# Patient Record
Sex: FEMALE | Race: WHITE | Employment: FULL TIME | URBAN - METROPOLITAN AREA
[De-identification: names, ages, dates, MRNs, and addresses within clinical notes are randomized per-mention and may not be internally consistent; named-entity substitution may affect disease eponyms.]

---

## 2017-05-15 ENCOUNTER — TRANSFERRED RECORDS (OUTPATIENT)
Dept: HEALTH INFORMATION MANAGEMENT | Facility: CLINIC | Age: 52
End: 2017-05-15

## 2017-05-22 ENCOUNTER — MEDICAL CORRESPONDENCE (OUTPATIENT)
Dept: HEALTH INFORMATION MANAGEMENT | Facility: CLINIC | Age: 52
End: 2017-05-22

## 2017-05-23 ENCOUNTER — TELEPHONE (OUTPATIENT)
Dept: SURGERY | Facility: CLINIC | Age: 52
End: 2017-05-23

## 2017-05-23 NOTE — TELEPHONE ENCOUNTER
I called Nan to discuss scheduling an appointment for her to see Dr. Mccurdy in clinic. We received a referral for this patient from Dr. Eddie Lindsay for a rectal polyp. We discussed dates and found one on Wednesday 06/07/2017 at 11:00 am. I told her that I would have to verify it with Dr. Mccurdy. She said she would pencil it in on her calendar. I told her that I would let her know when I hear from Dr. Mccurdy about the appointment I offered her.

## 2017-05-24 ENCOUNTER — PRE VISIT (OUTPATIENT)
Dept: SURGERY | Facility: CLINIC | Age: 52
End: 2017-05-24

## 2017-05-24 NOTE — TELEPHONE ENCOUNTER
1.  Date/reason for appt: 6/7/17- Rectal Polyp    2.  Referring provider: Dr. Lindsay     3.  Call to patient (Yes / No - short description): No - pt is referred.     4.  Previous care at / records requested from:     1. [facility] (formerly [facility]) (Referral and records was received, already scanned into Epic 5/15/17, 5/22/17.)

## 2017-05-25 ENCOUNTER — TELEPHONE (OUTPATIENT)
Dept: SURGERY | Facility: CLINIC | Age: 52
End: 2017-05-25

## 2017-05-25 NOTE — TELEPHONE ENCOUNTER
I called Nan to let her know that I have rescheduled her appointment. I left a detailed message with the appointment details and my number.

## 2017-06-07 ENCOUNTER — OFFICE VISIT (OUTPATIENT)
Dept: SURGERY | Facility: CLINIC | Age: 52
End: 2017-06-07

## 2017-06-07 VITALS
HEART RATE: 86 BPM | TEMPERATURE: 98.3 F | HEIGHT: 61 IN | OXYGEN SATURATION: 97 % | BODY MASS INDEX: 30.96 KG/M2 | SYSTOLIC BLOOD PRESSURE: 134 MMHG | WEIGHT: 164 LBS | DIASTOLIC BLOOD PRESSURE: 79 MMHG

## 2017-06-07 DIAGNOSIS — K62.1 RECTAL POLYP: Primary | ICD-10-CM

## 2017-06-07 ASSESSMENT — PAIN SCALES - GENERAL: PAINLEVEL: NO PAIN (0)

## 2017-06-07 NOTE — PROGRESS NOTES
Colon and Rectal Surgery Consult Clinic Note    Chief Complaint:  Colonoscopy follow up    History of Present Illness:  Nan Keller is a 51 year old female with history of medically controlled asthma presenting to clinic for second opinion of a large polyp found on screening colonoscopy. Nan has been previously healthy. On May 16-17 she underwent a screening colonoscopy with Dr. Lindsay after a positive cologuard test.   At that time, he removed a small polyp (hyperplastic) and biopsied a second larger polyp (adenoma), located at 5 cm from the anal verge.  The polyp was tattooed.   CEA level were within normal limits and biopsy returned low risk for neoplasm.    Review of systems was entirely negative, among which she denies any gastrointestinal symptoms including--no abdominal pain, constipation/diarrhoea, nausea/vomiting, changes in bowel habits, rectal bleeding/gross bloody stools; no night sweats, increased sweating, weight loss.    Medical history:  No past medical history on file.  Surgical history:  Past Surgical History:   Procedure Laterality Date     BUNIONECTOMY CHEVRON  2/3/2012    Procedure:BUNIONECTOMY CHEVRON; Chevron Osteotomy Bunionectomy Right Foot; Surgeon:BOSTON BUSTAMANTE; Location:[facility] OR     LAMINECTOMY LUMBAR POSTERIOR MICROSCOPIC ONE LEVEL N/A 2016    Procedure: LAMINECTOMY LUMBAR POSTERIOR MICROSCOPIC ONE LEVEL;  Surgeon: Peng Polanco MD;  Location: [facility] OR   ,     Family history:  No family history on file.   Paternal grandfather: cardiac arrest (70s)  Paternal grandmother: Tuberculosis, seizure  Maternal grandmother: Heart failure (80s)  Maternal grandfather: stroke, HTN, DM2  Father: CHF, HTN, parkinson's disease, alzheimers (87)  Mother: Dm2, hypothyroidism  Siblings: (brother) COPD, DM2, renal stones; (sister) renal stones    Medications:  Current Outpatient Prescriptions   Medication Sig Dispense Refill     albuterol (PROAIR HFA, PROVENTIL HFA, VENTOLIN HFA)  "108 (90 BASE) MCG/ACT inhaler Inhale 2 puffs into the lungs every 6 hours       GABAPENTIN PO      B7 supplements  Ibuprofen 400mg PO qD    Allergies:  The patientis allergic to sulfa drugs; animal dander; and codeine sulfate.  Social history:  Social History   Substance Use Topics     Smoking status: Never Smoker     Smokeless tobacco: Never Used     Alcohol use Not on file   2-3 Alcoholic drinks/week, no illicit substance or drug use  Sexually active with male partner, no anal sex.    Marital status: .  Occupation: Flight paramedic past 8 years, previously EMS for 19 years.  Review of Systems:  Answers for HPI/ROS submitted by the patient on 5/30/2017   General Symptoms: No  Skin Symptoms: No  HENT Symptoms: No  EYE SYMPTOMS: No  HEART SYMPTOMS: No  LUNG SYMPTOMS: No  INTESTINAL SYMPTOMS: No  URINARY SYMPTOMS: No  GYNECOLOGIC SYMPTOMS: No  BREAST SYMPTOMS: No  SKELETAL SYMPTOMS: No  BLOOD SYMPTOMS: No  NERVOUS SYSTEM SYMPTOMS: No  MENTAL HEALTH SYMPTOMS: No    Physical Examination:  /79  Pulse 86  Temp 98.3  F (36.8  C) (Oral)  Ht 5' 1\"  Wt 164 lb  LMP 08/09/2014  SpO2 97%  BMI 30.99 kg/m2  General: Well appearing female in no apparent distress, pleasant, appropriately dressed for appointment/season.  Abdomen: Examination was deferred at this time due to patient's developed apprehension.    Labs 5/15/2017 outside clinic:  Na 136, K 3.6, CO2 104, Cl 22, BUN 12, Cr 0.8, Glucose 103.  Albumin 4.3.  LFTs normal.  Hgb 15, WBC 6.3, Plt 261    Assessment/Plan:  Rectal polyp, found to be an adenoma on biopsy. Patient referred by Dr. Lindsay for consideration of a transanal excision.  As the patient was very reluctant for examination without sedation, she will be scheduled for a flexible sigmoidoscopy in the endoscopy center.    Time spent: 30 min  >50% spent in discussing, counseling and coordinating care    Seymour COOPER served as scribe for the history, PMH, FH, SH.    I saw and discussed the patient's " history and evaluation. I answered all of her questions to the best of my ability.  Carmelita Mccurdy MD, MPH, FACS  Colon and Rectal Surgery Staff  [facility]      Referring Provider:  Carmelita Mccurdy MD   PHYSICIANS  [address]  [place] [address]     Primary Care Provider:  Navneet Gil

## 2017-06-07 NOTE — LETTER
2017       RE: Nan Keller  [address] [place], [place] [address] [facility] [address]     Dear Colleague,    Thank you for referring your patient, Nan Keller, to the [facility] COLON AND RECTAL SURGERY at [facility]. Please see a copy of my visit note below.    Colon and Rectal Surgery Consult Clinic Note    Chief Complaint:  Colonoscopy follow up    History of Present Illness:  Nan Keller is a 51 year old female with history of medically controlled asthma presenting to clinic for second opinion of a large polyp found on screening colonoscopy. Nan has been previously healthy. On May 16-17 she underwent a screening colonoscopy with Dr. Lindsay after a positive cologuard test.   At that time, he removed a small polyp (hyperplastic) and biopsied a second larger polyp (adenoma), located at 5 cm from the anal verge.  The polyp was tattooed.   CEA level were within normal limits and biopsy returned low risk for neoplasm.    Review of systems was entirely negative, among which she denies any gastrointestinal symptoms including--no abdominal pain, constipation/diarrhoea, nausea/vomiting, changes in bowel habits, rectal bleeding/gross bloody stools; no night sweats, increased sweating, weight loss.    Medical history:  No past medical history on file.  Surgical history:  Past Surgical History:   Procedure Laterality Date     BUNIONECTOMY CHEVRON  2/3/2012    Procedure:BUNIONECTOMY CHEVRON; Chevron Osteotomy Bunionectomy Right Foot; Surgeon:BOSTON BUSTAMANTE; Location:[facility] [place]     LAMINECTOMY LUMBAR POSTERIOR MICROSCOPIC ONE LEVEL N/A 2016    Procedure: LAMINECTOMY LUMBAR POSTERIOR MICROSCOPIC ONE LEVEL;  Surgeon: Peng Polanco MD;  Location: [facility] OR   1987    Family history:  No family history on file.   Paternal grandfather: cardiac arrest (70s)  Paternal grandmother: Tuberculosis, seizure  Maternal grandmother: Heart failure (80s)  Maternal grandfather: stroke, HTN,  "DM2  Father: CHF, HTN, parkinson's disease, alzheimers (87)  Mother: Dm2, hypothyroidism  Siblings: (brother) COPD, DM2, renal stones; (sister) renal stones    Medications:  Current Outpatient Prescriptions   Medication Sig Dispense Refill     albuterol (PROAIR HFA, PROVENTIL HFA, VENTOLIN HFA) 108 (90 BASE) MCG/ACT inhaler Inhale 2 puffs into the lungs every 6 hours       GABAPENTIN PO      B7 supplements  Ibuprofen 400mg PO qD    Allergies:  The patientis allergic to sulfa drugs; animal dander; and codeine sulfate.  Social history:  Social History   Substance Use Topics     Smoking status: Never Smoker     Smokeless tobacco: Never Used     Alcohol use Not on file   2-3 Alcoholic drinks/week, no illicit substance or drug use  Sexually active with male partner, no anal sex.    Marital status: .  Occupation: Flight paramedic past 8 years, previously EMS for 19 years.  Review of Systems:    Physical Examination:  /79  Pulse 86  Temp 98.3  F (36.8  C) (Oral)  Ht 5' 1\"  Wt 164 lb  LMP 08/09/2014  SpO2 97%  BMI 30.99 kg/m2  General: Well appearing female in no apparent distress, pleasant, appropriately dressed for appointment/season.  Abdomen: Examination was deferred at this time due to patient's developed apprehension.    Labs 5/15/2017 outside clinic:  Na 136, K 3.6, CO2 104, Cl 22, BUN 12, Cr 0.8, Glucose 103.  Albumin 4.3.  LFTs normal.  Hgb 15, WBC 6.3, Plt 261    Assessment/Plan:  Rectal polyp, found to be an adenoma on biopsy. Patient referred by Dr. Lindsay for consideration of a transanal excision.  As the patient was very reluctant for examination without sedation, she will be scheduled for a flexible sigmoidoscopy in the endoscopy center.    Time spent: 30 min  >50% spent in discussing, counseling and coordinating care    Seymour COOPER served as scribe for the history, PMH, FH, SH.    I saw and discussed the patient's history and evaluation. I answered all of her questions to the best of my " ability.  Carmelita Mccurdy MD, MPH, FACS  Colon and Rectal Surgery Staff  [facility]      Referring Provider:  Carmelita Mccurdy MD   PHYSICIANS  [address]  [place] [address]     Primary Care Provider:  Navneet Gil MD

## 2017-06-07 NOTE — MR AVS SNAPSHOT
After Visit Summary   6/7/2017    Nan Keller    MRN: [id number]           Patient Information     Date Of Birth          1965        Visit Information        Provider Department      6/7/2017 4:00 PM Carmelita Mccurdy MD [facility] Colon and Rectal Surgery        Today's Diagnoses     Rectal polyp    -  1       Follow-ups after your visit        Additional Services     GASTROENTEROLOGY ADULT REF PROCEDURE ONLY                 Your next 10 appointments already scheduled     Jun 19, 2017   Procedure with Carmelita Mccurdy MD   [facility], [place], Endoscopy ([facility], [facility])    [address]  [facility] [place] [address] [phone]           [facility] is located on the corner of [facility] [address] [facility] on the [facility]. It is easily accessible from virtually any point in the [facility] area, via [employer] and [employer].              Who to contact     Please call your clinic at [phone] to:    Ask questions about your health    Make or cancel appointments    Discuss your medicines    Learn about your test results    Speak to your doctor   If you have compliments or concerns about an experience at your clinic, or if you wish to file a complaint, please contact [facility] Patient Relations at [phone] or email us at [email or web address]         Additional Information About Your Visit        [employer] Information     [employer] gives you secure access to your electronic health record. If you see a primary care provider, you can also send messages to your care team and make appointments. If you have questions, please call your primary care clinic.  If you do not have a primary care provider, please call [phone] and they will assist you.      [employer] is an electronic gateway that provides easy, online access to your medical records. With [employer], you can request a clinic  "appointment, read your test results, renew a prescription or communicate with your care team.     To access your existing account, please contact your [facility] or call [phone] for assistance.        Care EveryWhere ID     This is your Care EveryWhere ID. This could be used by other organizations to access your [facility] medical records  [id number]        Your Vitals Were     Pulse Temperature Height Last Period Pulse Oximetry BMI (Body Mass Index)    86 98.3  F (36.8  C) (Oral) 5' 1\" 08/09/2014 97% 30.99 kg/m2       Blood Pressure from Last 3 Encounters:   06/07/17 134/79   08/09/16 108/51   04/10/12 101/44    Weight from Last 3 Encounters:   06/07/17 164 lb   08/09/16 149 lb   04/10/12 130 lb              We Performed the Following     GASTROENTEROLOGY ADULT REF PROCEDURE ONLY        Primary Care Provider Office Phone # Fax #    Navneet Gil [phone]       [address] [address]        Thank you!     Thank you for choosing [facility] COLON AND RECTAL SURGERY  for your care. Our goal is always to provide you with excellent care. Hearing back from our patients is one way we can continue to improve our services. Please take a few minutes to complete the written survey that you may receive in the mail after your visit with us. Thank you!             Your Updated Medication List - Protect others around you: Learn how to safely use, store and throw away your medicines at www.disposemymeds.org.          This list is accurate as of: 6/7/17 10:26 PM.  Always use your most recent med list.                   Brand Name Dispense Instructions for use    albuterol 108 (90 BASE) MCG/ACT Inhaler    PROAIR HFA/PROVENTIL HFA/VENTOLIN HFA     Inhale 2 puffs into the lungs every 6 hours       GABAPENTIN PO            "

## 2017-06-07 NOTE — NURSING NOTE
"Chief Complaint   Patient presents with     Clinic Care Coordination - Initial     new       Vitals:    06/07/17 1440   BP: 134/79   Pulse: 86   Temp: 98.3  F (36.8  C)   TempSrc: Oral   SpO2: 97%   Weight: 164 lb   Height: 5' 1\"       Body mass index is 30.99 kg/(m^2).  Carmelita JOHNSON LPN                     "

## 2017-06-09 ENCOUNTER — TELEPHONE (OUTPATIENT)
Dept: GASTROENTEROLOGY | Facility: CLINIC | Age: 52
End: 2017-06-09

## 2017-06-09 NOTE — TELEPHONE ENCOUNTER
Patient scheduled for Flexible sigmoidosocpy    Indication for procedure. Rectal polyp    Referring Provider. Dr. Lindsay    ? no    Arrival time verified? 10:00 am    Facility location verified? Yes, [address]    Instructions given regarding prep and procedure    Prep Type 2 fleet enemas    Are you taking any anticoagulants or blood thinners? no    Instructions given? yes    Electronic implanted devices? No    Pre procedure teaching completed? Yes    Transportation from procedure? yes    H&P / Pre op physical completed? NA

## 2017-06-19 ENCOUNTER — SURGERY (OUTPATIENT)
Age: 52
End: 2017-06-19

## 2017-06-19 ENCOUNTER — HOSPITAL ENCOUNTER (OUTPATIENT)
Facility: CLINIC | Age: 52
Discharge: HOME | End: 2017-06-19
Attending: COLON & RECTAL SURGERY | Admitting: COLON & RECTAL SURGERY
Payer: COMMERCIAL

## 2017-06-19 VITALS
DIASTOLIC BLOOD PRESSURE: 61 MMHG | OXYGEN SATURATION: 95 % | HEART RATE: 77 BPM | RESPIRATION RATE: 21 BRPM | SYSTOLIC BLOOD PRESSURE: 99 MMHG

## 2017-06-19 DIAGNOSIS — K62.1 RECTAL POLYP: Primary | ICD-10-CM

## 2017-06-19 LAB — FLEXIBLE SIGMOIDOSCOPY: NORMAL

## 2017-06-19 PROCEDURE — 40000104 ZZH STATISTIC MODERATE SEDATION < 10 MIN: Performed by: COLON & RECTAL SURGERY

## 2017-06-19 PROCEDURE — 25000132 ZZH RX MED GY IP 250 OP 250 PS 637: Performed by: COLON & RECTAL SURGERY

## 2017-06-19 PROCEDURE — 25000128 H RX IP 250 OP 636: Performed by: COLON & RECTAL SURGERY

## 2017-06-19 PROCEDURE — 25000125 ZZHC RX 250: Performed by: COLON & RECTAL SURGERY

## 2017-06-19 PROCEDURE — 45330 DIAGNOSTIC SIGMOIDOSCOPY: CPT | Performed by: COLON & RECTAL SURGERY

## 2017-06-19 RX ORDER — FENTANYL CITRATE 50 UG/ML
INJECTION, SOLUTION INTRAMUSCULAR; INTRAVENOUS PRN
Status: DISCONTINUED | OUTPATIENT
Start: 2017-06-19 | End: 2017-06-19 | Stop reason: HOSPADM

## 2017-06-19 RX ORDER — SIMETHICONE
LIQUID (ML) MISCELLANEOUS PRN
Status: DISCONTINUED | OUTPATIENT
Start: 2017-06-19 | End: 2017-06-19 | Stop reason: HOSPADM

## 2017-06-19 RX ADMIN — Medication 2 ML: at 09:57

## 2017-06-19 RX ADMIN — FENTANYL CITRATE 100 MCG: 50 INJECTION, SOLUTION INTRAMUSCULAR; INTRAVENOUS at 10:28

## 2017-06-19 RX ADMIN — MIDAZOLAM HYDROCHLORIDE 2 MG: 1 INJECTION, SOLUTION INTRAMUSCULAR; INTRAVENOUS at 10:28

## 2017-06-19 NOTE — IP AVS SNAPSHOT
MRN:[id number]                      After Visit Summary   6/19/2017    Nan Keller    MRN: [id number]           Thank you!     Thank you for choosing [facility] for your care. Our goal is always to provide you with excellent care. Hearing back from our patients is one way we can continue to improve our services. Please take a few minutes to complete the written survey that you may receive in the mail after you visit with us. Thank you!        Patient Information     Date Of Birth          1965        About your hospital stay     You were admitted on:  June 19, 2017 You last received care in the:  [facility], Endoscopy    You were discharged on:  June 19, 2017       Who to Call     For medical emergencies, please call 911.  For non-urgent questions about your medical care, please call your primary care provider or clinic, [phone]  For questions related to your surgery, please call your surgery clinic        Attending Provider     Provider Specialty    Carmelita Mccurdy MD Colon and Rectal Surgery       Primary Care Provider Office Phone # Fax #    Navneet Gil [phone]      Your next 10 appointments already scheduled     Jun 19, 2017   Procedure with Carmelita Mccurdy MD   [facility] ([facility])    [address] [facility] [address] [phone]           The [facility] is located on the corner of [facility] [address] and [facility] on the [facility]. It is easily accessible from virtually any point in the [facility] area, via [address].              Further instructions from your care team       Discharge Instructions after Colonoscopy  or Sigmoidoscopy    Today you had a Sigmoidoscopy    Activity and Diet  You were given medicine for pain. You may be dizzy or sleepy.  For 24 hours:    Do not drive or use heavy equipment.    Do not make important  decisions.    Do not drink any alcohol.  You may return to your normal diet and medicines.    Discomfort    Air was placed in your colon during the exam in order to see it. Walking helps to pass the air.    You may take Tylenol (acetaminophen) for pain unless your doctor has told you not to.  Do not take aspirin or ibuprofen (Advil, Motrin, or other anti-inflammatory  drugs) for _____ days.    When to call:    Call right away if you have:    Unusual pain in belly or chest pain not relieved with passing air.    More than 1 to 2 Tablespoons of bleeding from your rectum.    Fever above 100.6  F (37.5  C).    If you have severe pain, bleeding, or shortness of breath, go to an emergency room.    If you have questions, call:  Monday to Friday, 7 a.m. to 4:30 p.m.  Endoscopy: [phone] (We may have to call you back)    After hours  Hospital: [phone] (Ask for the GI fellow on call)    Pending Results     No orders found from 6/17/2017 to 6/20/2017.            Admission Information     Date & Time Provider Department Dept. Phone    6/19/2017 Carmelita Mccurdy MD [facility], Endoscopy [phone]      Your Vitals Were     Blood Pressure Pulse Respirations Last Period Pulse Oximetry       102/77 77 12 08/09/2014 99%       MyChart Information     [employer] gives you secure access to your electronic health record. If you see a primary care provider, you can also send messages to your care team and make appointments. If you have questions, please call your primary care clinic.  If you do not have a primary care provider, please call [phone] and they will assist you.        Care EveryWhere ID     This is your Care EveryWhere ID. This could be used by other organizations to access your [facility] medical records  [id number]           Review of your medicines      UNREVIEWED medicines. Ask your doctor about these medicines        Dose / Directions    albuterol 108 (90 BASE) MCG/ACT Inhaler   Commonly known as:   PROAIR HFA/PROVENTIL HFA/VENTOLIN HFA        Dose:  2 puff   Inhale 2 puffs into the lungs every 6 hours   Refills:  0       GABAPENTIN PO        Refills:  0                Protect others around you: Learn how to safely use, store and throw away your medicines at www.disposemymeds.org.             Medication List: This is a list of all your medications and when to take them. Check marks below indicate your daily home schedule. Keep this list as a reference.      Medications           Morning Afternoon Evening Bedtime As Needed    albuterol 108 (90 BASE) MCG/ACT Inhaler   Commonly known as:  PROAIR HFA/PROVENTIL HFA/VENTOLIN HFA   Inhale 2 puffs into the lungs every 6 hours                                GABAPENTIN PO

## 2017-06-19 NOTE — IP AVS SNAPSHOT
[facility], [place], Endoscopy    [address] [facility] [address]    Phone:  [phone]                                       After Visit Summary   6/19/2017    Nan Keller    MRN: [id number]           After Visit Summary Signature Page     I have received my discharge instructions, and my questions have been answered. I have discussed any challenges I see with this plan with the nurse or doctor.    ..........................................................................................................................................  Patient/Patient Representative Signature      ..........................................................................................................................................  Patient Representative Print Name and Relationship to Patient    ..................................................               ................................................  Date                                            Time    ..........................................................................................................................................  Reviewed by Signature/Title    ...................................................              ..............................................  Date                                                            Time

## 2017-06-19 NOTE — OR NURSING
Procedure: Flex sig without interventions  Sedation: Conscious sedation, versed 2 mg, fentanyl 100 mcg  O2: 2 LPM for procedure, room air post.  Tolerated: Well. VS stable during and post procedure.  Report: Given to recovery RN  Pt to recovery area, condition stable, accompanied by RN.     Linda Ramos RN

## 2017-06-19 NOTE — DISCHARGE INSTRUCTIONS
Discharge Instructions after Colonoscopy  or Sigmoidoscopy    Today you had a Sigmoidoscopy    Activity and Diet  You were given medicine for pain. You may be dizzy or sleepy.  For 24 hours:    Do not drive or use heavy equipment.    Do not make important decisions.    Do not drink any alcohol.  You may return to your normal diet and medicines.    Discomfort    Air was placed in your colon during the exam in order to see it. Walking helps to pass the air.    You may take Tylenol (acetaminophen) for pain unless your doctor has told you not to.  Do not take aspirin or ibuprofen (Advil, Motrin, or other anti-inflammatory  drugs) for _____ days.    When to call:    Call right away if you have:    Unusual pain in belly or chest pain not relieved with passing air.    More than 1 to 2 Tablespoons of bleeding from your rectum.    Fever above 100.6  F (37.5  C).    If you have severe pain, bleeding, or shortness of breath, go to an emergency room.    If you have questions, call:  Monday to Friday, 7 a.m. to 4:30 p.m.  Endoscopy: [phone] (We may have to call you back)    After hours  Hospital: [phone] (Ask for the GI fellow on call)

## 2017-06-21 ENCOUNTER — CARE COORDINATION (OUTPATIENT)
Dept: SURGERY | Facility: CLINIC | Age: 52
End: 2017-06-21

## 2017-06-21 NOTE — PROGRESS NOTES
Rec'd following msg from our :    Wilfrido Flores,     This patient has a flex sig on 6/19/17 with Dr. Mccurdy.  She called today and she stated she had a painful lump just below her navel.  Also stated she is experiencing acid reflux since the flex sig.  Could someone call the patient?     Thank you,   Mandy     Called pt to discuss symptoms but had to lv VM msg.  Asked pt to call clinic if sx continued and supplied our #.

## 2017-06-21 NOTE — PROGRESS NOTES
"Nan is calling Dr. Mccurdy.  She reports flex sig 2 days ago.  Yesterday she noted \"a hard mass\" above her belly button.  It's tender if she pushes on it.  Doesn't recall it being here before.  Hasn't changed and is still there.  Yesterday had acid reflux.  Usually does not.  That has improved and is better today.  Is having BM's.  Is not nauseated, does not have a fever.  Tomorrow will be at work [phone].    Advised she call back with changes.  After hours number provided so can reach on call physician if problems arise in the night.  Nan verbalizes understanding and agreement with the plan.  "

## 2017-06-24 ENCOUNTER — HEALTH MAINTENANCE LETTER (OUTPATIENT)
Age: 52
End: 2017-06-24

## 2017-06-27 ENCOUNTER — TELEPHONE (OUTPATIENT)
Dept: SURGERY | Facility: CLINIC | Age: 52
End: 2017-06-27

## 2017-06-27 NOTE — TELEPHONE ENCOUNTER
Nan called to schedule her surgery. She left a message and I returned her call. We discussed dates and she opted to wait until September for the surgery due to her work schedule. She will have her pre-op done locally. She started asking a lot of questions about recovery and bowel prep. I asked her if she would like a call from Erika Bailey, RAFAELCC and she agreed that would be beneficial.

## 2017-06-29 ENCOUNTER — TELEPHONE (OUTPATIENT)
Dept: SURGERY | Facility: CLINIC | Age: 52
End: 2017-06-29

## 2017-06-29 NOTE — TELEPHONE ENCOUNTER
Nan returned my call and let me know that she would like to reschedule because she and her  don't want to compete for the bathroom while they are both prepping for their procedures. I told her that she does not have a prep and she said if that is the case then she can keep her surgery date. I told her that I would send her surgery packet out this week.

## 2017-06-29 NOTE — TELEPHONE ENCOUNTER
Nan called and left a message asking me to call her back. I attempted to call her home phone number and was unable to get through. I called her mobile phone and left a message letting her know that I received her message. I left my number for her to return my call.

## 2017-08-30 ENCOUNTER — DOCUMENTATION ONLY (OUTPATIENT)
Dept: SURGERY | Facility: CLINIC | Age: 52
End: 2017-08-30

## 2017-08-30 NOTE — PROGRESS NOTES
[facility] called to discuss her procedure and find out if there are any labs we need and where to send the pre-op visit note. I gave them the name of the procedure and the fax number where they should send the note.

## 2017-09-14 ENCOUNTER — ANESTHESIA EVENT (OUTPATIENT)
Dept: SURGERY | Facility: AMBULATORY SURGERY CENTER | Age: 52
End: 2017-09-14

## 2017-09-15 ENCOUNTER — HOSPITAL ENCOUNTER (OUTPATIENT)
Facility: AMBULATORY SURGERY CENTER | Age: 52
End: 2017-09-15
Attending: COLON & RECTAL SURGERY

## 2017-09-15 ENCOUNTER — ANESTHESIA (OUTPATIENT)
Dept: SURGERY | Facility: AMBULATORY SURGERY CENTER | Age: 52
End: 2017-09-15

## 2017-09-15 ENCOUNTER — SURGERY (OUTPATIENT)
Age: 52
End: 2017-09-15

## 2017-09-15 VITALS
SYSTOLIC BLOOD PRESSURE: 106 MMHG | DIASTOLIC BLOOD PRESSURE: 57 MMHG | OXYGEN SATURATION: 99 % | RESPIRATION RATE: 16 BRPM | HEART RATE: 57 BPM | WEIGHT: 164 LBS | TEMPERATURE: 98.6 F | HEIGHT: 60 IN | BODY MASS INDEX: 32.2 KG/M2

## 2017-09-15 DIAGNOSIS — K62.89 RECTAL MASS: Primary | ICD-10-CM

## 2017-09-15 RX ORDER — MEPERIDINE HYDROCHLORIDE 25 MG/ML
12.5 INJECTION INTRAMUSCULAR; INTRAVENOUS; SUBCUTANEOUS
Status: DISCONTINUED | OUTPATIENT
Start: 2017-09-15 | End: 2017-09-16 | Stop reason: HOSPADM

## 2017-09-15 RX ORDER — LIDOCAINE HYDROCHLORIDE 20 MG/ML
INJECTION, SOLUTION INFILTRATION; PERINEURAL PRN
Status: DISCONTINUED | OUTPATIENT
Start: 2017-09-15 | End: 2017-09-15

## 2017-09-15 RX ORDER — LIDOCAINE 40 MG/G
CREAM TOPICAL
Status: DISCONTINUED | OUTPATIENT
Start: 2017-09-15 | End: 2017-09-15 | Stop reason: HOSPADM

## 2017-09-15 RX ORDER — OXYCODONE HYDROCHLORIDE 5 MG/1
5 TABLET ORAL EVERY 4 HOURS PRN
Status: DISCONTINUED | OUTPATIENT
Start: 2017-09-15 | End: 2017-09-16 | Stop reason: HOSPADM

## 2017-09-15 RX ORDER — ONDANSETRON 4 MG/1
4 TABLET, ORALLY DISINTEGRATING ORAL
Status: DISCONTINUED | OUTPATIENT
Start: 2017-09-15 | End: 2017-09-16 | Stop reason: HOSPADM

## 2017-09-15 RX ORDER — DEXAMETHASONE SODIUM PHOSPHATE 4 MG/ML
INJECTION, SOLUTION INTRA-ARTICULAR; INTRALESIONAL; INTRAMUSCULAR; INTRAVENOUS; SOFT TISSUE PRN
Status: DISCONTINUED | OUTPATIENT
Start: 2017-09-15 | End: 2017-09-15

## 2017-09-15 RX ORDER — SODIUM CHLORIDE, SODIUM LACTATE, POTASSIUM CHLORIDE, CALCIUM CHLORIDE 600; 310; 30; 20 MG/100ML; MG/100ML; MG/100ML; MG/100ML
INJECTION, SOLUTION INTRAVENOUS CONTINUOUS
Status: DISCONTINUED | OUTPATIENT
Start: 2017-09-15 | End: 2017-09-16 | Stop reason: HOSPADM

## 2017-09-15 RX ORDER — FENTANYL CITRATE 50 UG/ML
25-50 INJECTION, SOLUTION INTRAMUSCULAR; INTRAVENOUS
Status: DISCONTINUED | OUTPATIENT
Start: 2017-09-15 | End: 2017-09-16 | Stop reason: HOSPADM

## 2017-09-15 RX ORDER — ACETAMINOPHEN 325 MG/1
975 TABLET ORAL ONCE
Status: COMPLETED | OUTPATIENT
Start: 2017-09-15 | End: 2017-09-15

## 2017-09-15 RX ORDER — SODIUM CHLORIDE, SODIUM LACTATE, POTASSIUM CHLORIDE, CALCIUM CHLORIDE 600; 310; 30; 20 MG/100ML; MG/100ML; MG/100ML; MG/100ML
INJECTION, SOLUTION INTRAVENOUS CONTINUOUS
Status: DISCONTINUED | OUTPATIENT
Start: 2017-09-15 | End: 2017-09-15 | Stop reason: HOSPADM

## 2017-09-15 RX ORDER — ONDANSETRON 4 MG/1
4 TABLET, ORALLY DISINTEGRATING ORAL EVERY 30 MIN PRN
Status: DISCONTINUED | OUTPATIENT
Start: 2017-09-15 | End: 2017-09-16 | Stop reason: HOSPADM

## 2017-09-15 RX ORDER — ONDANSETRON 2 MG/ML
INJECTION INTRAMUSCULAR; INTRAVENOUS PRN
Status: DISCONTINUED | OUTPATIENT
Start: 2017-09-15 | End: 2017-09-15

## 2017-09-15 RX ORDER — PROPOFOL 10 MG/ML
INJECTION, EMULSION INTRAVENOUS CONTINUOUS PRN
Status: DISCONTINUED | OUTPATIENT
Start: 2017-09-15 | End: 2017-09-15

## 2017-09-15 RX ORDER — ONDANSETRON 2 MG/ML
4 INJECTION INTRAMUSCULAR; INTRAVENOUS EVERY 30 MIN PRN
Status: DISCONTINUED | OUTPATIENT
Start: 2017-09-15 | End: 2017-09-16 | Stop reason: HOSPADM

## 2017-09-15 RX ORDER — NALOXONE HYDROCHLORIDE 0.4 MG/ML
.1-.4 INJECTION, SOLUTION INTRAMUSCULAR; INTRAVENOUS; SUBCUTANEOUS
Status: DISCONTINUED | OUTPATIENT
Start: 2017-09-15 | End: 2017-09-16 | Stop reason: HOSPADM

## 2017-09-15 RX ORDER — BUPIVACAINE HYDROCHLORIDE 5 MG/ML
INJECTION, SOLUTION PERINEURAL PRN
Status: DISCONTINUED | OUTPATIENT
Start: 2017-09-15 | End: 2017-09-15 | Stop reason: HOSPADM

## 2017-09-15 RX ORDER — GABAPENTIN 300 MG/1
300 CAPSULE ORAL ONCE
Status: COMPLETED | OUTPATIENT
Start: 2017-09-15 | End: 2017-09-15

## 2017-09-15 RX ORDER — PROPOFOL 10 MG/ML
INJECTION, EMULSION INTRAVENOUS PRN
Status: DISCONTINUED | OUTPATIENT
Start: 2017-09-15 | End: 2017-09-15

## 2017-09-15 RX ORDER — OXYCODONE HYDROCHLORIDE 5 MG/1
5 TABLET ORAL EVERY 6 HOURS PRN
Qty: 10 TABLET | Refills: 0 | Status: SHIPPED | OUTPATIENT
Start: 2017-09-15 | End: 2018-05-04

## 2017-09-15 RX ORDER — KETAMINE HYDROCHLORIDE 10 MG/ML
INJECTION, SOLUTION INTRAMUSCULAR; INTRAVENOUS PRN
Status: DISCONTINUED | OUTPATIENT
Start: 2017-09-15 | End: 2017-09-15

## 2017-09-15 RX ADMIN — PROPOFOL 50 MG: 10 INJECTION, EMULSION INTRAVENOUS at 10:58

## 2017-09-15 RX ADMIN — PROPOFOL 100 MCG/KG/MIN: 10 INJECTION, EMULSION INTRAVENOUS at 11:00

## 2017-09-15 RX ADMIN — KETAMINE HYDROCHLORIDE 25 MG: 10 INJECTION, SOLUTION INTRAMUSCULAR; INTRAVENOUS at 11:11

## 2017-09-15 RX ADMIN — DEXAMETHASONE SODIUM PHOSPHATE 4 MG: 4 INJECTION, SOLUTION INTRA-ARTICULAR; INTRALESIONAL; INTRAMUSCULAR; INTRAVENOUS; SOFT TISSUE at 11:06

## 2017-09-15 RX ADMIN — ACETAMINOPHEN 975 MG: 325 TABLET ORAL at 10:20

## 2017-09-15 RX ADMIN — LIDOCAINE HYDROCHLORIDE 70 MG: 20 INJECTION, SOLUTION INFILTRATION; PERINEURAL at 10:58

## 2017-09-15 RX ADMIN — FENTANYL CITRATE 25 MCG: 50 INJECTION, SOLUTION INTRAMUSCULAR; INTRAVENOUS at 12:34

## 2017-09-15 RX ADMIN — OXYCODONE HYDROCHLORIDE 5 MG: 5 TABLET ORAL at 12:34

## 2017-09-15 RX ADMIN — PROPOFOL 20 MG: 10 INJECTION, EMULSION INTRAVENOUS at 11:11

## 2017-09-15 RX ADMIN — ONDANSETRON 4 MG: 2 INJECTION INTRAMUSCULAR; INTRAVENOUS at 11:06

## 2017-09-15 RX ADMIN — GABAPENTIN 300 MG: 300 CAPSULE ORAL at 10:20

## 2017-09-15 RX ADMIN — PROPOFOL 20 MG: 10 INJECTION, EMULSION INTRAVENOUS at 11:00

## 2017-09-15 RX ADMIN — BUPIVACAINE HYDROCHLORIDE 10 ML: 5 INJECTION, SOLUTION PERINEURAL at 12:01

## 2017-09-15 RX ADMIN — ONDANSETRON 4 MG: 2 INJECTION INTRAMUSCULAR; INTRAVENOUS at 12:12

## 2017-09-15 RX ADMIN — SODIUM CHLORIDE, SODIUM LACTATE, POTASSIUM CHLORIDE, CALCIUM CHLORIDE: 600; 310; 30; 20 INJECTION, SOLUTION INTRAVENOUS at 10:54

## 2017-09-15 NOTE — OP NOTE
PREOPERATIVE DIAGNOSIS:  Rectal polyp; biopsy shows adenoma.      POSTOPERATIVE DIAGNOSIS:  Rectal polyp; biopsy shows adenoma.      PROCEDURE:  Transanal excision of rectal polyp.      SURGEON:  Carmelita Mccurdy MD      FIRST ASSISTANT:  Craig Mott MD, [facility] Colorectal Surgery Fellow        SECOND ASSISTANT:  Kaiser Escobedo MD, [facility] General Surgery Resident      ANESTHESIA:  General sedation.      INDICATIONS:  Nan is a 52-year-old female who underwent a screening colonoscopy.  There was a broad-based polyp seen in the distal rectum.  She was referred for further management here.  She was examined in the Endoscopy Suite.  The polyp did not appear to be a cancer.  It appeared to be a good candidate for local excision.  She presents today for that procedure.      DESCRIPTION OF PROCEDURE:  She was brought to the operating room and placed in the prone position.  Buttocks were taped apart.  She was prepped with Betadine paint.  We performed a time-out confirming the name of the patient and planned procedure.  We irrigated the rectum with dilute Betadine.  The lesion was approximately 6 cm from the anal verge in the anterior midline.  This was retracted into the field using some stay sutures with 3-0 Vicryl.  I did a vaginal exam, and it did appear to be by palpation at least above the level of the vagina.  Using electrocautery, I excised this circumferentially with a several-millimeter margin.  I did not think the polyp appeared to harbor an adenocarcinoma, and I did not think a wider margin would be required.  The defect was approximately 2 cm in greatest dimension, and it was closed in a semi-transverse fashion using a lot of running 3-0 Vicryl stitch.  The vagina was inspected with the proctoscope.  There was no evidence of any bleeding or visible stitches.  The proctoscope was then inserted in the anus and easily advanced to the area of the suture line, which was not bleeding.   With a little gentle manipulation with the obturator, the proctoscope was easily advanced beyond and proximal to the closure, and there was no evidence of a rectal obstruction.      SPECIMENS SUBMITTED:  Rectal polyp.      ESTIMATED BLOOD LOSS:  1 mL         SARAH RESENDEZ MD             D: 09/15/2017 12:02   T: 09/15/2017 12:53   MT: whitney      Name:     ISHA CORTES   MRN:      [id number]        Account:        [id number]   :      1965           Procedure Date: 09/15/2017      Document: [id number]       cc: Eddie Lindsay DO

## 2017-09-15 NOTE — IP AVS SNAPSHOT
[facility]    [address] [place] [address]    Phone:  [phone]    Fax:  [phone]                                       After Visit Summary   9/15/2017    Nan Keller    MRN: [id number]           After Visit Summary Signature Page     I have received my discharge instructions, and my questions have been answered. I have discussed any challenges I see with this plan with the nurse or doctor.    ..........................................................................................................................................  Patient/Patient Representative Signature      ..........................................................................................................................................  Patient Representative Print Name and Relationship to Patient    ..................................................               ................................................  Date                                            Time    ..........................................................................................................................................  Reviewed by Signature/Title    ...................................................              ..............................................  Date                                                            Time

## 2017-09-15 NOTE — ANESTHESIA POSTPROCEDURE EVALUATION
Patient: Nan Keller    Procedure(s):  Transanal Excision of Rectal Polyp - Wound Class: II-Clean Contaminated    Diagnosis:Rectal Polyp  Diagnosis Additional Information: No value filed.    Anesthesia Type:  MAC    Note:  Anesthesia Post Evaluation    Patient location during evaluation: PACU  Patient participation: Able to fully participate in evaluation  Level of consciousness: awake and alert  Pain management: adequate  Airway patency: patent  Cardiovascular status: hemodynamically stable  Respiratory status: nonlabored ventilation, spontaneous ventilation and room air  Hydration status: euvolemic  PONV: none     Anesthetic complications: None          Last vitals:  Vitals:    09/15/17 1245 09/15/17 1300 09/15/17 1315   BP: 107/61 107/62 106/57   Pulse:      Resp: 16 16 16   Temp:  37  C (98.6  F)    SpO2: 99% 99% 99%         Electronically Signed By: Chandana Wilson MD  September 15, 2017  2:47 PM

## 2017-09-15 NOTE — DISCHARGE INSTRUCTIONS
Anorectal Surgery Instructions      What can I expect after anorectal surgery?  Most anorectal procedures are done as outpatient surgery, and you go home the same day as the procedure. A few surgical procedures will require that you stay in the hospital for about one to three days. No matter where the procedure is done or how long or short it takes, these recommendations will help you heal and feel more comfortable.    Medicines:  The anal area is very sensitive; you can expect to have some pain for up to 2-4 weeks after the procedure. Your doctor will give you a prescription for one or more pain medications. In general, there are two types of medicines that can provide relief.      Non-steroidal anti-inflammatory drugs (NSAIDS). This class of drugs includes ibuprophen and naproxen. Your doctor may give you a prescription for these, or you can buy the identical drugs in smaller size tablets, over the counter. Brand names include: Advil or Aleve. In general, it is best to take these drugs on a regular basis following your surgery. Some patients cannot take these drugs, either because they cause stomach upset or if the individual has a history of ulcers or gastritis. The drugs are best taken with food. The maximum dose of ibuprofen is 800 milligrams 3 times per dayOR 600 milligrams 4 times per day. The maximum does of naproxen is 500 milligrams 2 times per day. Your doctor may give you a prescription for one or the other of these drugs, or you can buy them at your drugstore.    Narcotic pain relievers. These include Vicodin, Percocet, and Tylenol number 3. These are all prescription medications. These should be used as prescribed and as needed. Because these drugs have side effects (including constipation), you should reduce your use of these medications as tolerated as your pain improves.    In general, the best strategy is to take (if you are able to tolerate it) an NSAID medication on a regular basis until your  pain has largely gone away. You can take the narcotic pain medicine in addition to the NSAID medicine. Most patients use the full dose of the NSAID medication and narcotic pain reliever for the first few days after their operation. As your pain begins to lessen, you should cut back on your narcotic use while continuing to take your regular NSAID medication.    Some patients find it easiest to transition away from narcotic drugs by also taking acetaminophen (Tylenol). However, it is important to realize that most narcotic pain relievers also have acetaminophen, and excessive doses of acetaminophen can be dangerous.Accordingly, you can substitute 1000 milligrams of acetaminophen (two Extra Strength Tylenol or similar generic) for any given dose of narcotic, but acetominophen should not be taken in addition to a full narcotic dose that contains acetaminophen. The maximum acceptable dose of acetaminophen is 4000 milligrams.    Refilling prescriptions. If you need additional pain medication, please call the triage nurse at [phone] during normal business hours (8 a.m. to 4 p.m., Monday though Friday) or have your pharmacy fax a refill request to [phone]. If you call after hours or on the weekends, the doctor on call may not know you personally and may not renew narcotic pain medication by phone. Call your primary care provider for all other medication refills.     Bowel function and Perineal care:  Bowel movements can be very painful. It is important to have regular bowel movements at least every other day and to keep your stool soft. Your doctor may tell you to take a stool softener, such as Colace, once or twice a day. Try to avoid constipation and/or diarrhea as this can make the pain and bleeding worse. A high fiber diet, including at least four servings of fruits or vegetables daily, will help to keep your bowel movements regular and soft. If you have trouble getting enough fiber in your daily diet, a fiber  supplement can be considered, including Metamucil, Benefiber, or Citrucel, and can be bought at your local grocery store or pharmacy. It is important to drink six to eight glasses of water or juice everyday when using fiber products.    You can expect to have some bleeding after bowel movements, but it should stop soon after you wipe. Use a wet cloth or perianal pad (Tucks or Preparation H pads) to gently wipe the area after each bowel movement. Do not rub the anal area or use a lot of pressure. Using a spray bottle filled with warm water helps loosen any remaining stool. Blot gently with a soft dry cloth or tissue paper.    If possible, take a tub bath immediately after each bowel movement. Baths should be take at least 3 times daily for the first week to 10 days following your procedure. You should soak in the tub for 10 to 15 minutes each time with water as warm as you can tolerate. Even after you go back to work, it is a good idea to sit in the tub in the morning, after returning from work, and again in the evening before bedtime.    Constipation will cause you to strain when you have a bowel movement. The hard stool will be difficult to pass, will increase pain and bleeding, and will slow down healing. If you have not had a bowel movement within 2 days, take 2 teaspoons of Milk of Magnesia in the morning. If there are no results, repeat this. Stop taking Milk of Magnesia or other laxatives if you begin to have diarrhea.    Diarrhea can occur if too much laxative is taken or for several other reasons. If you have 3 or more loose, watery stools in a 24-hour period, stop taking laxatives. Continue to eat a high fiber diet and to take bulk-forming agents such as Metamucil, Benefiber, or Citrucel. You may take Immodium as directed on the package but please call the triage nurse, [phone], if this was not discussed with you surgeon preoperatively.    If you are still having diarrhea or constipation after you have  tried these recommendations, please call the triage nurse line, [phone].    Bleeding/Infection:  Infection around the anal opening is not very common. The anal area has excellent blood supply, which helps the area to heal. Bloody discharge after bowel movements is normal and may last 2 to 4 weeks after your surgery. However, if you bleed between bowel movements and cannot get it to stop, call the triage nurse immediately [phone].    Activity:  After your procedure, there are no restrictions on your activity except restrictions surrounding being on narcotics and in pain, such as no heavy machine operating or driving. You may walk, climb stairs, ride in a car, and sit as tolerated. It is helpful to avoid sitting in one position for long periods (2 or more hours). After some surgeries, you may be told not to perform any lifting (more than 10 pounds) for several weeks after surgery.    When do I need to call the doctor or triage nurse?  If you experience any of the problems listed here, call our triage nurse during business hours ([phone]). The nurse will help you with your problem or have the doctor call you. After hours and on weekends, please call the main hospital number ([phone]) and ask for the colon and rectal surgery person on call. Some is available to help you 24 hours a day, seven days a week.    Fever greater than 101 degrees    Chills    Foul-smelling drainage    Nausea and vomiting    Diarrhea - greater than 3 water stools in 24 hours    Constipation - no bowel movement after 3 days    Severe bleeding that does not stop soon after a bowel movement    Problems with the incision, including increased pain, swelling, or redness        [facility]  [address] [place] [address]  Phone: [phone]                [facility]  Home Care Following Anesthesia  For 24 hours after surgery:  1. Get plenty of rest.  A responsible  adult must stay with you for at least 24 hours after you leave the surgery center.  2. Do not drive or use heavy equipment.  If you have weakness or tingling, don't drive or use heavy equipment until this feeling goes away.   3. Do not drink alcohol.   4. Avoid strenuous or risky activities.  Ask for help when climbing stairs.  5. You may feel lightheaded.  IF so, sit for a few minutes before standing.  Have someone help you get up.   6. If you have nausea (feel sick to your stomach): Drink only clear liquids such as apple juice, ginger ale, broth or 7-Up.  Rest may also help.  Be sure to drink enough fluids.  Move to a regular diet as you feel able.   7. You may have a slight fever.  Call the doctor if your fever is over 100 F (37.7 C) (taken under the tongue) or lasts longer than 24 hours.  8. You may have a dry mouth, a sore throat, muscle aches or trouble sleeping. These should go away after 24 hours.  9. Do not make important or legal decisions.     Tips for taking pain medications  To get the best pain relief possible, remember these points:    Take pain medications as directed, before pain becomes severe.    Pain medication can upset your stomach: taking it with food may help.    Constipation is a common side effect of pain medication. Drink plenty of  fluids.    Eat foods high in fiber. Take a stool softener if recommended by your doctor or pharmacist.    Do not drink alcohol, drive or operate machinery while taking pain medications.    Ask about other ways to control pain, such as with heat, ice or relaxation.    Tylenol/Acetaminophen Consumption  To help encourage the safe use of acetaminophen, the makers of TYLENOL  have lowered the maximum daily dose for single-ingredient Extra Strength TYLENOL  (acetaminophen) products sold in the [place] from 8 pills per day (4,000 mg) to 6 pills per day (3,000 mg). The dosing interval has also changed from 2 pills every 4-6 hours to 2 pills every 6 hours.    If you feel  your pain relief is insufficient, you may take Tylenol/Acetaminophen in addition to your narcotic pain medication.     Be careful not to exceed 3,000 mg of Tylenol/Acetaminophen in a 24 hour period from all sources.    If you are taking extra strength Tylenol/acetaminophen (500 mg), the maximum dose is 6 tablets in 24 hours.    If you are taking regular strength acetaminophen (325 mg), the maximum dose is 9 tablets in 24 hours.    Call a doctor for any of the followin. Signs of infection (fever, growing tenderness at the surgery site, a large amount of drainage or bleeding, severe pain, foul-smelling drainage, redness, swelling).  2. It has been over 8 to 10 hours since surgery and you are still not able to urinate (pass water).  3. Headache for over 24 hours.  Your doctor is:  Dr. Carmelita Mccurdy, Colon Rectal: [phone]                    Or dial [phone] and ask for the resident on call for:  Colon Rectal  For emergency care, call the:  [facility]:  [phone] (TTY for hearing impaired: [phone])

## 2017-09-15 NOTE — ANESTHESIA PREPROCEDURE EVALUATION
Anesthesia Evaluation     .             ROS/MED HX    ENT/Pulmonary:     (+)asthma , . .    Neurologic:       Cardiovascular:         METS/Exercise Tolerance:     Hematologic:         Musculoskeletal:         GI/Hepatic:         Renal/Genitourinary:         Endo:     (+) Obesity, .      Psychiatric:         Infectious Disease:         Malignancy:         Other:                     Physical Exam  Normal systems: dental    Airway   Mallampati: II  TM distance: >3 FB  Neck ROM: full    Dental     Cardiovascular   Rhythm and rate: regular      Pulmonary    breath sounds clear to auscultation                    Anesthesia Plan      History & Physical Review  History and physical reviewed and following examination; no interval change.    ASA Status:  2 .    NPO Status:  > 8 hours    Plan for MAC with Intravenous induction. Maintenance will be TIVA.    PONV prophylaxis:  Ondansetron (or other 5HT-3)       Postoperative Care  Postoperative pain management:  Oral pain medications and Multi-modal analgesia.      Consents  Anesthetic plan, risks, benefits and alternatives discussed with:  Patient..                          .

## 2017-09-15 NOTE — BRIEF OP NOTE
[facility]    Brief Operative Note    Pre-operative diagnosis: Rectal Polyp  Post-operative diagnosis * No post-op diagnosis entered *  Procedure: Procedure(s):  Transanal Excision of Rectal Polyp - Wound Class: II-Clean Contaminated  Surgeon: Surgeon(s) and Role:     * Carmelita Mccurdy MD - Primary  Anesthesia: Combined MAC with Local   Estimated blood loss: Minimal  Drains: None  Specimens:   ID Type Source Tests Collected by Time Destination   A : rectal polyp Tissue Anus SURGICAL PATHOLOGY EXAM Carmelita Mccurdy MD 9/15/2017 11:31 AM      Findings:   None. Rectal polyp did not appear to be a cancer.  Complications: None.  Implants: None.

## 2017-09-15 NOTE — ANESTHESIA CARE TRANSFER NOTE
Patient: Nan Keller    Procedure(s):  Transanal Excision of Rectal Polyp - Wound Class: II-Clean Contaminated    Diagnosis: Rectal Polyp  Diagnosis Additional Information: No value filed.    Anesthesia Type:   MAC     Note:  Airway :Room Air  Patient transferred to:Phase II  Comments: Patient to Phase II on RA/native airway and is awake, verbal, and alert. Report to rn and transfer of care. BP: 103/60 HR: 58 Temp: 97.3 RR: 18 SpO2: 97% in RA      Vitals: (Last set prior to Anesthesia Care Transfer)    CRNA VITALS  9/15/2017 1131 - 9/15/2017 1205      9/15/2017             Pulse: 62    SpO2: 97 %    Resp Rate (set): 10                Electronically Signed By: JULIAN Robison CRNA  September 15, 2017  12:05 PM

## 2017-09-15 NOTE — IP AVS SNAPSHOT
MRN:[id number]                      After Visit Summary   9/15/2017    Nan Keller    MRN: [id number]           Thank you!     Thank you for choosing [facility] for your care. Our goal is always to provide you with excellent care. Hearing back from our patients is one way we can continue to improve our services. Please take a few minutes to complete the written survey that you may receive in the mail after you visit with us. Thank you!        Patient Information     Date Of Birth          1965        About your hospital stay     You were admitted on:  September 15, 2017 [facility]    You were discharged on:  September 15, 2017       Who to Call     For medical emergencies, please call 911.  For non-urgent questions about your medical care, please call your primary care provider or clinic, [phone]  For questions related to your surgery, please call your surgery clinic        Attending Provider     Provider Specialty    Carmelita Mccurdy MD Colon and Rectal Surgery       Primary Care Provider Office Phone # Fax #    Navneet Gil [phone]      After Care Instructions     No lifting        No lifting over 15 lbs and no strenuous physical activity for 2 weeks                  Further instructions from your care team         Anorectal Surgery Instructions      What can I expect after anorectal surgery?  Most anorectal procedures are done as outpatient surgery, and you go home the same day as the procedure. A few surgical procedures will require that you stay in the hospital for about one to three days. No matter where the procedure is done or how long or short it takes, these recommendations will help you heal and feel more comfortable.    Medicines:  The anal area is very sensitive; you can expect to have some pain for up to 2-4 weeks after the procedure. Your doctor will give you a prescription for one or more  pain medications. In general, there are two types of medicines that can provide relief.      Non-steroidal anti-inflammatory drugs (NSAIDS). This class of drugs includes ibuprophen and naproxen. Your doctor may give you a prescription for these, or you can buy the identical drugs in smaller size tablets, over the counter. Brand names include: Advil or Aleve. In general, it is best to take these drugs on a regular basis following your surgery. Some patients cannot take these drugs, either because they cause stomach upset or if the individual has a history of ulcers or gastritis. The drugs are best taken with food. The maximum dose of ibuprofen is 800 milligrams 3 times per dayOR 600 milligrams 4 times per day. The maximum does of naproxen is 500 milligrams 2 times per day. Your doctor may give you a prescription for one or the other of these drugs, or you can buy them at your drugstore.    Narcotic pain relievers. These include Vicodin, Percocet, and Tylenol number 3. These are all prescription medications. These should be used as prescribed and as needed. Because these drugs have side effects (including constipation), you should reduce your use of these medications as tolerated as your pain improves.    In general, the best strategy is to take (if you are able to tolerate it) an NSAID medication on a regular basis until your pain has largely gone away. You can take the narcotic pain medicine in addition to the NSAID medicine. Most patients use the full dose of the NSAID medication and narcotic pain reliever for the first few days after their operation. As your pain begins to lessen, you should cut back on your narcotic use while continuing to take your regular NSAID medication.    Some patients find it easiest to transition away from narcotic drugs by also taking acetaminophen (Tylenol). However, it is important to realize that most narcotic pain relievers also have acetaminophen, and excessive doses of  acetaminophen can be dangerous.Accordingly, you can substitute 1000 milligrams of acetaminophen (two Extra Strength Tylenol or similar generic) for any given dose of narcotic, but acetominophen should not be taken in addition to a full narcotic dose that contains acetaminophen. The maximum acceptable dose of acetaminophen is 4000 milligrams.    Refilling prescriptions. If you need additional pain medication, please call the triage nurse at [phone] during normal business hours (8 a.m. to 4 p.m., Monday though Friday) or have your pharmacy fax a refill request to [phone]. If you call after hours or on the weekends, the doctor on call may not know you personally and may not renew narcotic pain medication by phone. Call your primary care provider for all other medication refills.     Bowel function and Perineal care:  Bowel movements can be very painful. It is important to have regular bowel movements at least every other day and to keep your stool soft. Your doctor may tell you to take a stool softener, such as Colace, once or twice a day. Try to avoid constipation and/or diarrhea as this can make the pain and bleeding worse. A high fiber diet, including at least four servings of fruits or vegetables daily, will help to keep your bowel movements regular and soft. If you have trouble getting enough fiber in your daily diet, a fiber supplement can be considered, including Metamucil, Benefiber, or Citrucel, and can be bought at your local grocery store or pharmacy. It is important to drink six to eight glasses of water or juice everyday when using fiber products.    You can expect to have some bleeding after bowel movements, but it should stop soon after you wipe. Use a wet cloth or perianal pad (Tucks or Preparation H pads) to gently wipe the area after each bowel movement. Do not rub the anal area or use a lot of pressure. Using a spray bottle filled with warm water helps loosen any remaining stool. Blot  gently with a soft dry cloth or tissue paper.    If possible, take a tub bath immediately after each bowel movement. Baths should be take at least 3 times daily for the first week to 10 days following your procedure. You should soak in the tub for 10 to 15 minutes each time with water as warm as you can tolerate. Even after you go back to work, it is a good idea to sit in the tub in the morning, after returning from work, and again in the evening before bedtime.    Constipation will cause you to strain when you have a bowel movement. The hard stool will be difficult to pass, will increase pain and bleeding, and will slow down healing. If you have not had a bowel movement within 2 days, take 2 teaspoons of Milk of Magnesia in the morning. If there are no results, repeat this. Stop taking Milk of Magnesia or other laxatives if you begin to have diarrhea.    Diarrhea can occur if too much laxative is taken or for several other reasons. If you have 3 or more loose, watery stools in a 24-hour period, stop taking laxatives. Continue to eat a high fiber diet and to take bulk-forming agents such as Metamucil, Benefiber, or Citrucel. You may take Immodium as directed on the package but please call the triage nurse, [phone], if this was not discussed with you surgeon preoperatively.    If you are still having diarrhea or constipation after you have tried these recommendations, please call the triage nurse line, [phone].    Bleeding/Infection:  Infection around the anal opening is not very common. The anal area has excellent blood supply, which helps the area to heal. Bloody discharge after bowel movements is normal and may last 2 to 4 weeks after your surgery. However, if you bleed between bowel movements and cannot get it to stop, call the triage nurse immediately [phone].    Activity:  After your procedure, there are no restrictions on your activity except restrictions surrounding being on narcotics and in  pain, such as no heavy machine operating or driving. You may walk, climb stairs, ride in a car, and sit as tolerated. It is helpful to avoid sitting in one position for long periods (2 or more hours). After some surgeries, you may be told not to perform any lifting (more than 10 pounds) for several weeks after surgery.    When do I need to call the doctor or triage nurse?  If you experience any of the problems listed here, call our triage nurse during business hours ([phone]). The nurse will help you with your problem or have the doctor call you. After hours and on weekends, please call the main hospital number ([phone]) and ask for the colon and rectal surgery person on call. Some is available to help you 24 hours a day, seven days a week.    Fever greater than 101 degrees    Chills    Foul-smelling drainage    Nausea and vomiting    Diarrhea - greater than 3 water stools in 24 hours    Constipation - no bowel movement after 3 days    Severe bleeding that does not stop soon after a bowel movement    Problems with the incision, including increased pain, swelling, or redness        [facility]  [address] [place] [id number]  Phone: [phone]                [facility]  Home Care Following Anesthesia  For 24 hours after surgery:  1. Get plenty of rest.  A responsible adult must stay with you for at least 24 hours after you leave the surgery center.  2. Do not drive or use heavy equipment.  If you have weakness or tingling, don't drive or use heavy equipment until this feeling goes away.   3. Do not drink alcohol.   4. Avoid strenuous or risky activities.  Ask for help when climbing stairs.  5. You may feel lightheaded.  IF so, sit for a few minutes before standing.  Have someone help you get up.   6. If you have nausea (feel sick to your stomach): Drink only clear liquids such as apple juice, ginger ale, broth or 7-Up.  Rest may also  help.  Be sure to drink enough fluids.  Move to a regular diet as you feel able.   7. You may have a slight fever.  Call the doctor if your fever is over 100 F (37.7 C) (taken under the tongue) or lasts longer than 24 hours.  8. You may have a dry mouth, a sore throat, muscle aches or trouble sleeping. These should go away after 24 hours.  9. Do not make important or legal decisions.     Tips for taking pain medications  To get the best pain relief possible, remember these points:    Take pain medications as directed, before pain becomes severe.    Pain medication can upset your stomach: taking it with food may help.    Constipation is a common side effect of pain medication. Drink plenty of  fluids.    Eat foods high in fiber. Take a stool softener if recommended by your doctor or pharmacist.    Do not drink alcohol, drive or operate machinery while taking pain medications.    Ask about other ways to control pain, such as with heat, ice or relaxation.    Tylenol/Acetaminophen Consumption  To help encourage the safe use of acetaminophen, the makers of TYLENOL  have lowered the maximum daily dose for single-ingredient Extra Strength TYLENOL  (acetaminophen) products sold in the [place] from 8 pills per day (4,000 mg) to 6 pills per day (3,000 mg). The dosing interval has also changed from 2 pills every 4-6 hours to 2 pills every 6 hours.    If you feel your pain relief is insufficient, you may take Tylenol/Acetaminophen in addition to your narcotic pain medication.     Be careful not to exceed 3,000 mg of Tylenol/Acetaminophen in a 24 hour period from all sources.    If you are taking extra strength Tylenol/acetaminophen (500 mg), the maximum dose is 6 tablets in 24 hours.    If you are taking regular strength acetaminophen (325 mg), the maximum dose is 9 tablets in 24 hours.    Call a doctor for any of the followin. Signs of infection (fever, growing tenderness at the surgery site, a large amount of drainage or  bleeding, severe pain, foul-smelling drainage, redness, swelling).  2. It has been over 8 to 10 hours since surgery and you are still not able to urinate (pass water).  3. Headache for over 24 hours.  Your doctor is:  Dr. Carmelita Mccurdy, Colon Rectal: [phone]                    Or dial [phone] and ask for the resident on call for:  Colon Rectal  For emergency care, call the:  [facility]:  [phone] (TTY for hearing impaired: [phone])                Pending Results     Date and Time Order Name Status Description    9/15/2017 1131 Surgical pathology exam In process             Admission Information     Date & Time Provider Department Dept. Phone    9/15/2017 Carmelita Mccurdy MD [facility] [phone]      Your Vitals Were     Blood Pressure Pulse Temperature Respirations Height Weight    111/61 57 97.7  F (36.5  C) (Temporal) 16 1.524 m (5') 74.4 kg (164 lb)    Last Period Pulse Oximetry BMI (Body Mass Index)             08/09/2014 98% 32.03 kg/m2         [employer] Information     [employer] gives you secure access to your electronic health record. If you see a primary care provider, you can also send messages to your care team and make appointments. If you have questions, please call your primary care clinic.  If you do not have a primary care provider, please call [phone] and they will assist you.      [employer] is an electronic gateway that provides easy, online access to your medical records. With [employer], you can request a clinic appointment, read your test results, renew a prescription or communicate with your care team.     To access your existing account, please contact your [facility] or call [phone] for assistance.        Care EveryWhere ID     This is your Care EveryWhere ID. This could be used by other organizations to access your [facility] medical records  [id number]        Equal Access to Services     YANICK  GAAR : Hadii aad alisa khoi Sojohnieali, waaxda luqadaha, qaybta kaalmada adegirma, gail leonidas sarthakrina walsh jessica vitaly . So [facility] [phone].    ATENCIÓN: Si habla español, tiene a ross disposición servicios gratuitos de asistencia lingüística. Llame al [phone].    We comply with applicable federal civil rights laws and [place] laws. We do not discriminate on the basis of race, color, national origin, age, disability sex, sexual orientation or gender identity.               Review of your medicines      UNREVIEWED medicines. Ask your doctor about these medicines        Dose / Directions    albuterol 108 (90 BASE) MCG/ACT Inhaler   Commonly known as:  PROAIR HFA/PROVENTIL HFA/VENTOLIN HFA        Dose:  2 puff   Inhale 2 puffs into the lungs every 6 hours as needed   Refills:  0       GABAPENTIN PO        Take by mouth as needed   Refills:  0         START taking        Dose / Directions    oxyCODONE 5 MG IR tablet   Commonly known as:  ROXICODONE   Used for:  Rectal mass        Dose:  5 mg   Take 1 tablet (5 mg) by mouth every 6 hours as needed for severe pain   Quantity:  10 tablet   Refills:  0            Where to get your medicines      Some of these will need a paper prescription and others can be bought over the counter. Ask your nurse if you have questions.     Bring a paper prescription for each of these medications     oxyCODONE 5 MG IR tablet                Protect others around you: Learn how to safely use, store and throw away your medicines at www.disposemymeds.org.             Medication List: This is a list of all your medications and when to take them. Check marks below indicate your daily home schedule. Keep this list as a reference.      Medications           Morning Afternoon Evening Bedtime As Needed    albuterol 108 (90 BASE) MCG/ACT Inhaler   Commonly known as:  PROAIR HFA/PROVENTIL HFA/VENTOLIN HFA   Inhale 2 puffs into the lungs every 6 hours as needed                                 GABAPENTIN PO   Take by mouth as needed   Last time this was given:  300 mg on 9/15/2017 10:20 AM                                oxyCODONE 5 MG IR tablet   Commonly known as:  ROXICODONE   Take 1 tablet (5 mg) by mouth every 6 hours as needed for severe pain

## 2017-09-18 ENCOUNTER — TELEPHONE (OUTPATIENT)
Dept: SURGERY | Facility: CLINIC | Age: 52
End: 2017-09-18

## 2017-09-19 LAB — COPATH REPORT: NORMAL

## 2017-09-20 ENCOUNTER — TELEPHONE (OUTPATIENT)
Dept: SURGERY | Facility: CLINIC | Age: 52
End: 2017-09-20

## 2017-09-20 NOTE — TELEPHONE ENCOUNTER
Melonie Beltre NP asked me to call Nan to schedule her post op with Dr. Mccurdy. I called and left a message with an appointment. I asked her to call me to confirm.

## 2017-10-03 ENCOUNTER — HOSPITAL ENCOUNTER (OUTPATIENT)
Facility: CLINIC | Age: 52
End: 2017-10-03
Attending: COLON & RECTAL SURGERY | Admitting: COLON & RECTAL SURGERY

## 2017-10-03 ENCOUNTER — TELEPHONE (OUTPATIENT)
Dept: GASTROENTEROLOGY | Facility: CLINIC | Age: 52
End: 2017-10-03

## 2017-10-03 DIAGNOSIS — Z87.19 HISTORY OF RECTAL POLYPS: Primary | ICD-10-CM

## 2017-10-03 NOTE — PROGRESS NOTES
Spoke with her on the phone re: pathology results from [facility]. Pathology shows TA. We discussed that I am not concerned about the area. A flex sig in 6 mo to 1 yr would be recommended, full cscopy 3 yrs from prior.  Patient has no sx, no prob with BMs, eating, abd pain.  Therefore she asked if she could cancel the post op. This is fine.

## 2018-03-27 ENCOUNTER — TELEPHONE (OUTPATIENT)
Dept: GASTROENTEROLOGY | Facility: CLINIC | Age: 53
End: 2018-03-27

## 2018-05-02 ENCOUNTER — TELEPHONE (OUTPATIENT)
Dept: GASTROENTEROLOGY | Facility: OUTPATIENT CENTER | Age: 53
End: 2018-05-02

## 2018-05-04 ENCOUNTER — TELEPHONE (OUTPATIENT)
Dept: GASTROENTEROLOGY | Facility: OUTPATIENT CENTER | Age: 53
End: 2018-05-04

## 2018-05-04 NOTE — TELEPHONE ENCOUNTER
Patient taking any blood thinners ? no     Heart disease ? denies    Lung disease ? denies      Sleep apnea ? denies    Diabetic ? denies    Kidney disease ? denies    Dialysis ? n/a    Electronic implanted medical devices ? denies    Are you taking any narcotic pain medication ?  no What is your daily dosage ?    PTSD ? n/a    Prep instructions reviewed with patient ?  Instructions,  policy, MAC sedation plan reviewed. Advised patient to have someone stay with her post exam    Pharmacy : n/a    Indication for procedure : History of rectal polyps    Referring provider : Self     Arrival Time :Patient will arrive at 8:30 AM

## 2018-05-09 ENCOUNTER — DOCUMENTATION ONLY (OUTPATIENT)
Dept: GASTROENTEROLOGY | Facility: OUTPATIENT CENTER | Age: 53
End: 2018-05-09
Payer: COMMERCIAL

## 2018-05-09 ENCOUNTER — TRANSFERRED RECORDS (OUTPATIENT)
Dept: HEALTH INFORMATION MANAGEMENT | Facility: CLINIC | Age: 53
End: 2018-05-09

## 2018-05-11 LAB — COPATH REPORT: NORMAL

## 2019-10-02 ENCOUNTER — HEALTH MAINTENANCE LETTER (OUTPATIENT)
Age: 54
End: 2019-10-02

## 2020-03-02 ENCOUNTER — ANESTHESIA EVENT (OUTPATIENT)
Dept: SURGERY | Facility: CLINIC | Age: 55
End: 2020-03-02
Payer: COMMERCIAL

## 2020-03-04 ENCOUNTER — HOSPITAL ENCOUNTER (OUTPATIENT)
Facility: CLINIC | Age: 55
Discharge: HOME | End: 2020-03-04
Attending: ORTHOPAEDIC SURGERY | Admitting: ORTHOPAEDIC SURGERY
Payer: COMMERCIAL

## 2020-03-04 ENCOUNTER — APPOINTMENT (OUTPATIENT)
Dept: GENERAL RADIOLOGY | Facility: CLINIC | Age: 55
End: 2020-03-04
Attending: ORTHOPAEDIC SURGERY
Payer: COMMERCIAL

## 2020-03-04 ENCOUNTER — ANESTHESIA (OUTPATIENT)
Dept: SURGERY | Facility: CLINIC | Age: 55
End: 2020-03-04
Payer: COMMERCIAL

## 2020-03-04 VITALS
SYSTOLIC BLOOD PRESSURE: 128 MMHG | OXYGEN SATURATION: 93 % | RESPIRATION RATE: 14 BRPM | TEMPERATURE: 97.3 F | DIASTOLIC BLOOD PRESSURE: 72 MMHG | HEART RATE: 88 BPM | WEIGHT: 167 LBS | BODY MASS INDEX: 32.61 KG/M2

## 2020-03-04 DIAGNOSIS — M51.27 HERNIATION OF INTERVERTEBRAL DISC OF LUMBOSACRAL REGION: Primary | ICD-10-CM

## 2020-03-04 PROCEDURE — 71000027 ZZH RECOVERY PHASE 2 EACH 15 MINS: Performed by: ORTHOPAEDIC SURGERY

## 2020-03-04 PROCEDURE — 40000985 XR LUMBAR SPINE PORT 1 VW: Mod: TC

## 2020-03-04 PROCEDURE — 71000013 ZZH RECOVERY PHASE 1 LEVEL 1 EA ADDTL HR: Performed by: ORTHOPAEDIC SURGERY

## 2020-03-04 PROCEDURE — 25000566 ZZH SEVOFLURANE, EA 15 MIN: Performed by: ORTHOPAEDIC SURGERY

## 2020-03-04 PROCEDURE — 71000012 ZZH RECOVERY PHASE 1 LEVEL 1 FIRST HR: Performed by: ORTHOPAEDIC SURGERY

## 2020-03-04 PROCEDURE — 25800030 ZZH RX IP 258 OP 636: Performed by: NURSE ANESTHETIST, CERTIFIED REGISTERED

## 2020-03-04 PROCEDURE — 25000125 ZZHC RX 250: Performed by: NURSE ANESTHETIST, CERTIFIED REGISTERED

## 2020-03-04 PROCEDURE — 25000128 H RX IP 250 OP 636: Performed by: NURSE ANESTHETIST, CERTIFIED REGISTERED

## 2020-03-04 PROCEDURE — 36000063 ZZH SURGERY LEVEL 4 EA 15 ADDTL MIN: Performed by: ORTHOPAEDIC SURGERY

## 2020-03-04 PROCEDURE — 36000065 ZZH SURGERY LEVEL 4 W FLUORO 1ST 30 MIN: Performed by: ORTHOPAEDIC SURGERY

## 2020-03-04 PROCEDURE — 40000306 ZZH STATISTIC PRE PROC ASSESS II: Performed by: ORTHOPAEDIC SURGERY

## 2020-03-04 PROCEDURE — 25000132 ZZH RX MED GY IP 250 OP 250 PS 637: Performed by: ORTHOPAEDIC SURGERY

## 2020-03-04 PROCEDURE — 25000125 ZZHC RX 250: Performed by: ORTHOPAEDIC SURGERY

## 2020-03-04 PROCEDURE — 25800025 ZZH RX 258: Performed by: ORTHOPAEDIC SURGERY

## 2020-03-04 PROCEDURE — 37000008 ZZH ANESTHESIA TECHNICAL FEE, 1ST 30 MIN: Performed by: ORTHOPAEDIC SURGERY

## 2020-03-04 PROCEDURE — 37000009 ZZH ANESTHESIA TECHNICAL FEE, EACH ADDTL 15 MIN: Performed by: ORTHOPAEDIC SURGERY

## 2020-03-04 PROCEDURE — 25000128 H RX IP 250 OP 636: Performed by: ORTHOPAEDIC SURGERY

## 2020-03-04 PROCEDURE — 27110028 ZZH OR GENERAL SUPPLY NON-STERILE: Performed by: ORTHOPAEDIC SURGERY

## 2020-03-04 PROCEDURE — 27210794 ZZH OR GENERAL SUPPLY STERILE: Performed by: ORTHOPAEDIC SURGERY

## 2020-03-04 PROCEDURE — 27210995 ZZH RX 272: Performed by: ORTHOPAEDIC SURGERY

## 2020-03-04 RX ORDER — ACETAMINOPHEN 325 MG/1
650 TABLET ORAL
Status: DISCONTINUED | OUTPATIENT
Start: 2020-03-04 | End: 2020-03-04 | Stop reason: HOSPADM

## 2020-03-04 RX ORDER — LIDOCAINE HYDROCHLORIDE 10 MG/ML
INJECTION, SOLUTION INFILTRATION; PERINEURAL PRN
Status: DISCONTINUED | OUTPATIENT
Start: 2020-03-04 | End: 2020-03-04

## 2020-03-04 RX ORDER — DEXAMETHASONE SODIUM PHOSPHATE 4 MG/ML
INJECTION, SOLUTION INTRA-ARTICULAR; INTRALESIONAL; INTRAMUSCULAR; INTRAVENOUS; SOFT TISSUE PRN
Status: DISCONTINUED | OUTPATIENT
Start: 2020-03-04 | End: 2020-03-04

## 2020-03-04 RX ORDER — HYDROCODONE BITARTRATE AND ACETAMINOPHEN 5; 325 MG/1; MG/1
1-2 TABLET ORAL EVERY 4 HOURS PRN
Qty: 20 TABLET | Refills: 0 | Status: ON HOLD | OUTPATIENT
Start: 2020-03-04 | End: 2020-10-21

## 2020-03-04 RX ORDER — PHENYLEPHRINE HYDROCHLORIDE 10 MG/ML
INJECTION INTRAVENOUS PRN
Status: DISCONTINUED | OUTPATIENT
Start: 2020-03-04 | End: 2020-03-04

## 2020-03-04 RX ORDER — ONDANSETRON 4 MG/1
4 TABLET, ORALLY DISINTEGRATING ORAL EVERY 30 MIN PRN
Status: DISCONTINUED | OUTPATIENT
Start: 2020-03-04 | End: 2020-03-04 | Stop reason: HOSPADM

## 2020-03-04 RX ORDER — SODIUM CHLORIDE, SODIUM LACTATE, POTASSIUM CHLORIDE, CALCIUM CHLORIDE 600; 310; 30; 20 MG/100ML; MG/100ML; MG/100ML; MG/100ML
INJECTION, SOLUTION INTRAVENOUS CONTINUOUS
Status: DISCONTINUED | OUTPATIENT
Start: 2020-03-04 | End: 2020-03-04 | Stop reason: HOSPADM

## 2020-03-04 RX ORDER — PROPOFOL 10 MG/ML
INJECTION, EMULSION INTRAVENOUS PRN
Status: DISCONTINUED | OUTPATIENT
Start: 2020-03-04 | End: 2020-03-04

## 2020-03-04 RX ORDER — HYDROXYZINE HYDROCHLORIDE 50 MG/1
50 TABLET, FILM COATED ORAL EVERY 6 HOURS PRN
Status: DISCONTINUED | OUTPATIENT
Start: 2020-03-04 | End: 2020-03-04 | Stop reason: HOSPADM

## 2020-03-04 RX ORDER — HYDROCODONE BITARTRATE AND ACETAMINOPHEN 5; 325 MG/1; MG/1
1-2 TABLET ORAL EVERY 4 HOURS PRN
Status: COMPLETED | OUTPATIENT
Start: 2020-03-04 | End: 2020-03-04

## 2020-03-04 RX ORDER — CEFAZOLIN SODIUM 1 G/50ML
1 INJECTION, SOLUTION INTRAVENOUS SEE ADMIN INSTRUCTIONS
Status: DISCONTINUED | OUTPATIENT
Start: 2020-03-04 | End: 2020-03-04 | Stop reason: HOSPADM

## 2020-03-04 RX ORDER — GLYCOPYRROLATE 0.2 MG/ML
INJECTION, SOLUTION INTRAMUSCULAR; INTRAVENOUS PRN
Status: DISCONTINUED | OUTPATIENT
Start: 2020-03-04 | End: 2020-03-04

## 2020-03-04 RX ORDER — ACETAMINOPHEN 325 MG/1
650 TABLET ORAL EVERY 4 HOURS PRN
Qty: 100 TABLET | Refills: 0 | COMMUNITY
Start: 2020-03-04

## 2020-03-04 RX ORDER — BUPIVACAINE HYDROCHLORIDE 2.5 MG/ML
INJECTION, SOLUTION INFILTRATION; PERINEURAL PRN
Status: DISCONTINUED | OUTPATIENT
Start: 2020-03-04 | End: 2020-03-04 | Stop reason: HOSPADM

## 2020-03-04 RX ORDER — HYDROXYZINE HYDROCHLORIDE 25 MG/1
25 TABLET, FILM COATED ORAL EVERY 6 HOURS PRN
Qty: 30 TABLET | Refills: 0 | Status: ON HOLD | OUTPATIENT
Start: 2020-03-04 | End: 2020-10-21

## 2020-03-04 RX ORDER — ACETAMINOPHEN 325 MG/1
975 TABLET ORAL ONCE
Status: COMPLETED | OUTPATIENT
Start: 2020-03-04 | End: 2020-03-04

## 2020-03-04 RX ORDER — HYDROXYZINE HYDROCHLORIDE 25 MG/1
25 TABLET, FILM COATED ORAL
Status: COMPLETED | OUTPATIENT
Start: 2020-03-04 | End: 2020-03-04

## 2020-03-04 RX ORDER — HYDROCODONE BITARTRATE AND ACETAMINOPHEN 5; 325 MG/1; MG/1
1-2 TABLET ORAL ONCE
Status: DISCONTINUED | OUTPATIENT
Start: 2020-03-04 | End: 2020-03-04 | Stop reason: HOSPADM

## 2020-03-04 RX ORDER — KETOROLAC TROMETHAMINE 30 MG/ML
INJECTION, SOLUTION INTRAMUSCULAR; INTRAVENOUS PRN
Status: DISCONTINUED | OUTPATIENT
Start: 2020-03-04 | End: 2020-03-04

## 2020-03-04 RX ORDER — HYDROXYZINE HYDROCHLORIDE 25 MG/1
25 TABLET, FILM COATED ORAL EVERY 6 HOURS PRN
Status: DISCONTINUED | OUTPATIENT
Start: 2020-03-04 | End: 2020-03-04 | Stop reason: HOSPADM

## 2020-03-04 RX ORDER — FENTANYL CITRATE 50 UG/ML
INJECTION, SOLUTION INTRAMUSCULAR; INTRAVENOUS PRN
Status: DISCONTINUED | OUTPATIENT
Start: 2020-03-04 | End: 2020-03-04

## 2020-03-04 RX ORDER — NEOSTIGMINE METHYLSULFATE 1 MG/ML
VIAL (ML) INJECTION PRN
Status: DISCONTINUED | OUTPATIENT
Start: 2020-03-04 | End: 2020-03-04

## 2020-03-04 RX ORDER — CEFAZOLIN SODIUM 2 G/100ML
2 INJECTION, SOLUTION INTRAVENOUS
Status: COMPLETED | OUTPATIENT
Start: 2020-03-04 | End: 2020-03-04

## 2020-03-04 RX ORDER — IBUPROFEN 600 MG/1
600 TABLET, FILM COATED ORAL EVERY 6 HOURS PRN
Qty: 30 TABLET | Refills: 0 | COMMUNITY
Start: 2020-03-04

## 2020-03-04 RX ORDER — FENTANYL CITRATE 50 UG/ML
25-50 INJECTION, SOLUTION INTRAMUSCULAR; INTRAVENOUS
Status: DISCONTINUED | OUTPATIENT
Start: 2020-03-04 | End: 2020-03-04 | Stop reason: HOSPADM

## 2020-03-04 RX ORDER — ONDANSETRON 2 MG/ML
4 INJECTION INTRAMUSCULAR; INTRAVENOUS EVERY 30 MIN PRN
Status: DISCONTINUED | OUTPATIENT
Start: 2020-03-04 | End: 2020-03-04 | Stop reason: HOSPADM

## 2020-03-04 RX ORDER — NALOXONE HYDROCHLORIDE 0.4 MG/ML
.1-.4 INJECTION, SOLUTION INTRAMUSCULAR; INTRAVENOUS; SUBCUTANEOUS
Status: DISCONTINUED | OUTPATIENT
Start: 2020-03-04 | End: 2020-03-04 | Stop reason: HOSPADM

## 2020-03-04 RX ORDER — ACETAMINOPHEN 325 MG/1
975 TABLET ORAL ONCE
Status: DISCONTINUED | OUTPATIENT
Start: 2020-03-04 | End: 2020-03-04 | Stop reason: HOSPADM

## 2020-03-04 RX ORDER — LIDOCAINE 40 MG/G
CREAM TOPICAL
Status: DISCONTINUED | OUTPATIENT
Start: 2020-03-04 | End: 2020-03-04 | Stop reason: HOSPADM

## 2020-03-04 RX ORDER — ONDANSETRON 2 MG/ML
INJECTION INTRAMUSCULAR; INTRAVENOUS PRN
Status: DISCONTINUED | OUTPATIENT
Start: 2020-03-04 | End: 2020-03-04

## 2020-03-04 RX ORDER — MEPERIDINE HYDROCHLORIDE 25 MG/ML
12.5 INJECTION INTRAMUSCULAR; INTRAVENOUS; SUBCUTANEOUS
Status: DISCONTINUED | OUTPATIENT
Start: 2020-03-04 | End: 2020-03-04 | Stop reason: HOSPADM

## 2020-03-04 RX ORDER — CEPHALEXIN 500 MG/1
1000 CAPSULE ORAL 3 TIMES DAILY
Qty: 6 CAPSULE | Refills: 0 | Status: SHIPPED | OUTPATIENT
Start: 2020-03-04 | End: 2020-03-05

## 2020-03-04 RX ORDER — EPHEDRINE SULFATE 50 MG/ML
INJECTION, SOLUTION INTRAVENOUS PRN
Status: DISCONTINUED | OUTPATIENT
Start: 2020-03-04 | End: 2020-03-04

## 2020-03-04 RX ADMIN — EPHEDRINE SULFATE 10 MG: 50 INJECTION, SOLUTION INTRAVENOUS at 08:35

## 2020-03-04 RX ADMIN — SODIUM CHLORIDE, POTASSIUM CHLORIDE, SODIUM LACTATE AND CALCIUM CHLORIDE: 600; 310; 30; 20 INJECTION, SOLUTION INTRAVENOUS at 08:43

## 2020-03-04 RX ADMIN — SODIUM CHLORIDE, POTASSIUM CHLORIDE, SODIUM LACTATE AND CALCIUM CHLORIDE: 600; 310; 30; 20 INJECTION, SOLUTION INTRAVENOUS at 06:42

## 2020-03-04 RX ADMIN — CEFAZOLIN SODIUM 1 G: 2 INJECTION, SOLUTION INTRAVENOUS at 09:28

## 2020-03-04 RX ADMIN — PHENYLEPHRINE HYDROCHLORIDE 150 MCG: 10 INJECTION INTRAVENOUS at 08:28

## 2020-03-04 RX ADMIN — LIDOCAINE HYDROCHLORIDE 50 MG: 10 INJECTION, SOLUTION INFILTRATION; PERINEURAL at 07:33

## 2020-03-04 RX ADMIN — ONDANSETRON 4 MG: 2 INJECTION INTRAMUSCULAR; INTRAVENOUS at 10:39

## 2020-03-04 RX ADMIN — ACETAMINOPHEN 975 MG: 325 TABLET, FILM COATED ORAL at 06:26

## 2020-03-04 RX ADMIN — ONDANSETRON 4 MG: 2 INJECTION INTRAMUSCULAR; INTRAVENOUS at 09:51

## 2020-03-04 RX ADMIN — EPHEDRINE SULFATE 10 MG: 50 INJECTION, SOLUTION INTRAVENOUS at 08:27

## 2020-03-04 RX ADMIN — HYDROXYZINE HYDROCHLORIDE 25 MG: 25 TABLET, FILM COATED ORAL at 12:18

## 2020-03-04 RX ADMIN — PROCHLORPERAZINE EDISYLATE 10 MG: 5 INJECTION, SOLUTION INTRAMUSCULAR; INTRAVENOUS at 11:15

## 2020-03-04 RX ADMIN — KETOROLAC TROMETHAMINE 30 MG: 30 INJECTION, SOLUTION INTRAMUSCULAR at 09:51

## 2020-03-04 RX ADMIN — PHENYLEPHRINE HYDROCHLORIDE 50 MCG: 10 INJECTION INTRAVENOUS at 09:15

## 2020-03-04 RX ADMIN — PHENYLEPHRINE HYDROCHLORIDE 150 MCG: 10 INJECTION INTRAVENOUS at 09:56

## 2020-03-04 RX ADMIN — PROPOFOL 200 MG: 10 INJECTION, EMULSION INTRAVENOUS at 07:33

## 2020-03-04 RX ADMIN — FENTANYL CITRATE 100 MCG: 50 INJECTION, SOLUTION INTRAMUSCULAR; INTRAVENOUS at 08:06

## 2020-03-04 RX ADMIN — PHENYLEPHRINE HYDROCHLORIDE 150 MCG: 10 INJECTION INTRAVENOUS at 08:05

## 2020-03-04 RX ADMIN — GLYCOPYRROLATE 0.2 MG: 0.2 INJECTION, SOLUTION INTRAMUSCULAR; INTRAVENOUS at 07:33

## 2020-03-04 RX ADMIN — PHENYLEPHRINE HYDROCHLORIDE 100 MCG: 10 INJECTION INTRAVENOUS at 09:47

## 2020-03-04 RX ADMIN — ROCURONIUM BROMIDE 40 MG: 10 INJECTION INTRAVENOUS at 07:33

## 2020-03-04 RX ADMIN — PHENYLEPHRINE HYDROCHLORIDE 100 MCG: 10 INJECTION INTRAVENOUS at 07:51

## 2020-03-04 RX ADMIN — PHENYLEPHRINE HYDROCHLORIDE 150 MCG: 10 INJECTION INTRAVENOUS at 09:07

## 2020-03-04 RX ADMIN — PHENYLEPHRINE HYDROCHLORIDE 150 MCG: 10 INJECTION INTRAVENOUS at 09:20

## 2020-03-04 RX ADMIN — HYDROCODONE BITARTRATE AND ACETAMINOPHEN 1 TABLET: 5; 325 TABLET ORAL at 12:17

## 2020-03-04 RX ADMIN — CEFAZOLIN SODIUM 2 G: 2 INJECTION, SOLUTION INTRAVENOUS at 07:28

## 2020-03-04 RX ADMIN — GLYCOPYRROLATE 0.4 MG: 0.2 INJECTION, SOLUTION INTRAMUSCULAR; INTRAVENOUS at 09:51

## 2020-03-04 RX ADMIN — MIDAZOLAM 2 MG: 1 INJECTION INTRAMUSCULAR; INTRAVENOUS at 07:28

## 2020-03-04 RX ADMIN — Medication 3 MG: at 09:51

## 2020-03-04 RX ADMIN — FENTANYL CITRATE 150 MCG: 50 INJECTION, SOLUTION INTRAMUSCULAR; INTRAVENOUS at 07:33

## 2020-03-04 RX ADMIN — LIDOCAINE HYDROCHLORIDE 1 ML: 10 INJECTION, SOLUTION EPIDURAL; INFILTRATION; INTRACAUDAL; PERINEURAL at 06:43

## 2020-03-04 RX ADMIN — PHENYLEPHRINE HYDROCHLORIDE 150 MCG: 10 INJECTION INTRAVENOUS at 08:51

## 2020-03-04 RX ADMIN — DEXAMETHASONE SODIUM PHOSPHATE 8 MG: 4 INJECTION, SOLUTION INTRA-ARTICULAR; INTRALESIONAL; INTRAMUSCULAR; INTRAVENOUS; SOFT TISSUE at 07:33

## 2020-03-04 RX ADMIN — PHENYLEPHRINE HYDROCHLORIDE 150 MCG: 10 INJECTION INTRAVENOUS at 09:30

## 2020-03-04 RX ADMIN — EPHEDRINE SULFATE 5 MG: 50 INJECTION, SOLUTION INTRAVENOUS at 08:15

## 2020-03-04 ASSESSMENT — LIFESTYLE VARIABLES: TOBACCO_USE: 1

## 2020-03-04 NOTE — ANESTHESIA PREPROCEDURE EVALUATION
Anesthesia Pre-Procedure Evaluation    Patient: Nan Keller   MRN: [id number] : 1965          Preoperative Diagnosis: Lumbar disc herniation [M51.26]    Procedure(s):  Left lumbar 5 sacral 1 discectomy.    No past medical history on file.  Past Surgical History:   Procedure Laterality Date     BUNIONECTOMY CHEVRON  2/3/2012    Procedure:BUNIONECTOMY CHEVRON; Chevron Osteotomy Bunionectomy Right Foot; Surgeon:BOSTON BUSTAMANTE; Location:[facility] OR     EXCISE POLYP RECTUM N/A 9/15/2017    Procedure: EXCISE POLYP RECTUM;  Transanal Excision of Rectal Polyp;  Surgeon: Carmelita Mccurdy MD;  Location: [facility] OR     LAMINECTOMY LUMBAR POSTERIOR MICROSCOPIC ONE LEVEL N/A 2016    Procedure: LAMINECTOMY LUMBAR POSTERIOR MICROSCOPIC ONE LEVEL;  Surgeon: Peng Polanco MD;  Location: [facility] OR     SIGMOIDOSCOPY FLEXIBLE N/A 2017    Procedure: SIGMOIDOSCOPY FLEXIBLE;;  Surgeon: Carmelita Mccurdy MD;  Location: [facility] GI       Anesthesia Evaluation     . Pt has had prior anesthetic. Type: General and MAC    No history of anesthetic complications          ROS/MED HX    ENT/Pulmonary:     (+)allergic rhinitis, tobacco use, Past use , . .    Neurologic:  - neg neurologic ROS     Cardiovascular:  - neg cardiovascular ROS       METS/Exercise Tolerance:     Hematologic:  - neg hematologic  ROS       Musculoskeletal:   (+)  other musculoskeletal- Lumbago      GI/Hepatic:     (+) Other GI/Hepatic Umbilical hernia      Renal/Genitourinary:  - ROS Renal section negative       Endo:     (+) Obesity, .      Psychiatric:  - neg psychiatric ROS       Infectious Disease:  - neg infectious disease ROS       Malignancy:      - no malignancy   Other:    - neg other ROS                      Physical Exam  Normal systems: cardiovascular, pulmonary and dental    Airway   Mallampati: I  TM distance: >3 FB  Neck ROM: full    Dental     Cardiovascular       Pulmonary             Lab Results   Component Value Date    HCG Negative  12/27/2005       Preop Vitals  BP Readings from Last 3 Encounters:   03/04/20 131/70   09/15/17 106/57   06/19/17 99/61    Pulse Readings from Last 3 Encounters:   09/15/17 57   06/19/17 77   06/07/17 86      Resp Readings from Last 3 Encounters:   03/04/20 16   09/15/17 16   06/19/17 21    SpO2 Readings from Last 3 Encounters:   03/04/20 99%   09/15/17 99%   06/19/17 95%      Temp Readings from Last 1 Encounters:   03/04/20 36.7  C (98  F) (Oral)    Ht Readings from Last 1 Encounters:   09/15/17 1.524 m (5')      Wt Readings from Last 1 Encounters:   03/04/20 75.8 kg (167 lb)    Estimated body mass index is 32.61 kg/m  as calculated from the following:    Height as of 9/15/17: 1.524 m (5').    Weight as of this encounter: 75.8 kg (167 lb).       Anesthesia Plan      History & Physical Review  History and physical reviewed and following examination; no interval change.    ASA Status:  2 .    NPO Status:  > 6 hours    Plan for General and ETT with Intravenous and Propofol induction. Maintenance will be Balanced.    PONV prophylaxis:  Ondansetron (or other 5HT-3) and Dexamethasone or Solumedrol  Additional equipment: Videolaryngoscope      Postoperative Care  Postoperative pain management:  IV analgesics, Oral pain medications and Multi-modal analgesia.      Consents                 JULIAN Khanna CRNA

## 2020-03-04 NOTE — ANESTHESIA PROCEDURE NOTES
Airway   Date/Time: 3/4/2020 7:35 AM   Patient location during procedure: OR    General Information and Staff   Resident/CRNA: Yue Cuba APRN CRNA  Performed: CRNA     Consent for Airway   Urgency: elective        Indications and Patient Condition  Indications for airway management: william-procedural and airway protection  Induction type:intravenous  Mask difficulty assessment: easy    Final Airway Details  Final airway type: endotracheal airway  Successful airway:ETT  ETT size (mm): 7.5 Cuffed: yes   Blade: C-Mac  Blade size: #3  Successful intubation technique: asleep  Facilitating devices/methods: C-Mac  Endotracheal tube insertion site: right side of mouth   Measured from: teeth  ETT to teeth (cm): 23  Grade View of Cords: 1Number of attempts at approach: 1  Number of other approaches attempted: 0    Secured with:tape  Ease of procedure: easy  Dentition: Intact

## 2020-03-04 NOTE — BRIEF OP NOTE
[facility]    Brief Operative Note    Pre-operative diagnosis: Lumbar disc herniation [M51.26]  Post-operative diagnosis L5-S1 disc herniation    Procedure: Procedure(s):  Left Lumbar 5 Sacral 1 Discectomy.  Surgeon: Surgeon(s) and Role:     * Sudhir Lala MD - Primary  Anesthesia: General   Estimated blood loss: 40mL  Drains: None  Specimens: * No specimens in log *  Findings:   left cranially extruded L5-S1 disc herniation.  Complications: None.  Implants: * No implants in log *

## 2020-03-04 NOTE — OP NOTE
Orthopedic  Operative Note    Pre-operative diagnosis: Lumbar disc herniation [M51.26]    Post-operative diagnosis: 1) left L5-S1 disc herniation with left L5 radiculopathy   2) mechanical low back pain    Procedure: 1) Left L5-S1 discectomy   2) Use of intraoperative microscope    Surgeon: Sudhir Lala MD    Assistant(s): None    Anesthesia: General endotracheal anesthesia and Local anesthesia with 0.25% plain marcaine injected in skin and paraspinal muscels    Estimated blood loss: 40mL     Drains: None    Specimens: None    Indications:                               Nan is a 54 year old female with history of prior L4-5 disc herniation s/p discectomy with Dr. Polanco in August 2016. In December 2019 she developed both mechanical low back pain, left side predominant, as well as left leg radicular pain. She was treated with a course of PT (both land and aquatic), epidural steroid, medications, activity modifications at work. Over the course of several months she failed to improve appreciably with these measures. We discussed that the back pain is best treated with conservative measures as above, but that her left lower extremity radicular pain may be amenable to relief with a discectomy surgery. She eventually elected for discectomy after several months of failed conservative treatment. We did discuss that the goal of surgery is primarily for the buttock and thigh pain she experiences and may not alleviate the back pain she is experiencing.    I discussed the risks of surgery with the the patient. There is the risk of general anesthesia, which is a risk for heart attach, stroke, respiratory compromise, death. There is a risk of all surgeries being bleeding or infection. In the case of spine surgery either of these could necessitate return to the OR in worst case scenario. There is a risk of recurrent disc herniation or failure to completely alleviate current symptoms. Also a risk of nerve root injury,  temporary or permanent, which could cause pain, paresthesias/dysesthesias or weakness. A risk for dural tear with persistent CSF leak. Having had the discussion above, the patient appears to understand the risks, intended outcomes, postoperative course and agrees to proceed with surgery.     Findings: Cranially extruded left L5-S1 disc herniation    Complications: None     Procedure Detail: The patient was seen in the preoperative holding area. The patient was again given the opportunity to ask questions regarding procedural detail, risks and benefits, expected post-operative recovery. All questions were answered and informed consent was signed. The low back was marked on the left side of her prior surgical incision with indelible ink at the anticipated level. The patient was then transferred back to the operative suite on a gurney. After satisfactory general anesthesia, the patient was carefully placed prone onto the Seun frame. All bony prominences were well-padded. The back was prepped and draped in the usual sterile manner. Prior to incision, a critical pause was observed to identify the correct patient, correct procedure, correct level and that appropriate imaging studies were available. I also confirmed that the patient received appropriate pre-operative prophylactic antibiotics. The patient had received 2g IV Ancef. All in the room were in agreement.  An 18 gauge spinal needle and its stylet were placed through the skin in midline at the anticipated level and a cross-table lateral x-ray was done to verify the starting point for the skin incision. A maker was used to yaya out the incision corresponding to the L5-S1 disc space     The skin and subcutaneous tissue was incised with 10-blade. Further dissection with electrocautery was used to reach the paraspinal fascia.  The fascia was incised and the left side. Garrett and bovie used to subperiosteally dissect the paraspinal muscles off the left of the spine  exposing the lamina and a Kocher clamp was placed on the presumed left hemilamina of L5 and another cross table lateral x-ray was taken to confirm this was correct. A high speed dilma was used to make a permanent yaya on the lamina. I then proceeded to dissect cranially up to the L4-5 disc space as well exposing the prior laminotomy defect.     Next, while using an operating microscope for visualization, a high dilma was then used to create a hemilaminotomy and to initiate a partial medial facetectomy on the left side at L5-S1. Kerrison rongeur was used to remove the ligamentum flavum and additional lamina. Due to the cranially extruded nature of the disc herniation I removed the entire left hemilamina of L5 to bridge to her prior decompression site above. The majority of the facet joint and the pars is preserved for stability purposes. The exiting L5 and traversing S1 nerves were identified. I retracted the traversing S1 nerve medially exposing the disc space at L5-S1. A palpable disc herniation was noted ventral in the lateral recess.  A nerve root retractor was placed to hold the nerve in place. I made an annulotomy directly over the extruded component. A ball-tipped probe was used to remove the soft disc material which came out in several small pieces. There was also some endplate cartilage that had herniated into this area as well which was removed with a pitutary rongeur. The traversing nerve and common dural tube was free to mobilize at L5-S1. I then used various sizes of ball-tipped probes to probe ventral to the axilla of the exiting L5 nerve and retreive additional disc herniation.  The disk space and epidural space was carefully irrigated and probed, finding no evidence of further impinging disk disease.  Hemostasis was achieved with bone wax, Floseal and bipolar cautery and the wound was copiously irrigated again. 0.25% Marcaine was injected into the paraspinal muscles and subcutaneous tissue at the  surgical site for post-operative pain relief.  The wound was then closed carefully using #1 Vicryl suture in the paraspinal fascia, 2-0 Vicryl in the deep dermal layer and 3-0 monocryl in subcuticular layer.  Steri-Strips and sterile dressings are applied. All sponge and needle counts were correct in the end. The patient appeared to tolerate the procedure well and was escorted the recovery room in satisfactory condition.          Condition: Stable     Weight bearing status: Weight bearing as tolerated     Activity:          Anticoagulation plan:    Plan:      Sudhir Lala MD  [facility]  Date:  3/4/2020  10:32 AM   Activity as tolerated  Patient may move about with assist as indicated or with supervision  No lifting >10lbs. No excessive forward bending/twisting.    Ambulation and mechanical prophylaxis.    Discharge to home when Phase II criteria met. Periop antibiotic prophylaxis. Norco for pain control. Follow up ~2 weeks.

## 2020-03-04 NOTE — ANESTHESIA POSTPROCEDURE EVALUATION
Patient: Nan Keller    Procedure(s):  Left Lumbar 5 Sacral 1 Discectomy.    Diagnosis:Lumbar disc herniation [M51.26]  Diagnosis Additional Information: No value filed.    Anesthesia Type:  General, ETT    Note:  Anesthesia Post Evaluation    Patient location during evaluation: Phase 2  Patient participation: Able to fully participate in evaluation  Level of consciousness: awake and alert  Pain management: adequate  Airway patency: patent  Cardiovascular status: acceptable and hemodynamically stable  Respiratory status: acceptable and room air  Hydration status: acceptable  PONV: none     Anesthetic complications: None          Last vitals:  Vitals:    03/04/20 1115 03/04/20 1130 03/04/20 1215   BP: 136/73 120/67 124/73   Pulse: 68 68    Resp: 13 12 16   Temp: 36.3  C (97.3  F)     SpO2: 92% 92% 93%         Electronically Signed By: JULIAN Khanna CRNA  March 4, 2020  12:30 PM

## 2020-03-04 NOTE — ANESTHESIA CARE TRANSFER NOTE
Patient: Nan Keller    Procedure(s):  Left Lumbar 5 Sacral 1 Discectomy.    Diagnosis: Lumbar disc herniation [M51.26]  Diagnosis Additional Information: No value filed.    Anesthesia Type:   General, ETT     Note:  Airway :Face Mask  Patient transferred to:PACU  Handoff Report: Identifed the Patient, Identified the Reponsible Provider, Reviewed the pertinent medical history, Discussed the surgical course, Reviewed Intra-OP anesthesia mangement and issues during anesthesia, Set expectations for post-procedure period and Allowed opportunity for questions and acknowledgement of understanding      Vitals: (Last set prior to Anesthesia Care Transfer)    CRNA VITALS  3/4/2020 0946 - 3/4/2020 1021      3/4/2020             Pulse:  86    SpO2:  98 %    Resp Rate (observed):  11                Electronically Signed By: JULIAN Khanna CRNA  March 4, 2020  10:21 AM

## 2020-03-04 NOTE — DISCHARGE INSTRUCTIONS
Lumbar Spine Surgery Discharge Instructions    Your surgery was: Left L5-S1 discectomy    Your surgeon is: Sudhir Lala MD    Restrictions after lumbar surgery:  - You should not do any excessive bending or twisting of your back beyond that needed to get in and out of a bed/chair, a car, or other similar daily activities.    - No lifting greater than 10 lbs (approximately what 1 gallon of milk weighs) until you are instructed that it is okay at your clinic follow-up visit.    - You should not drive a car or operate heavy machinery if you are taking prescribed narcotic pain medication    - Wound Care Instructions: Under your operative site dressing there are steri strips (small band-aids that go over the incision site). You can remove your dressing two days after surgery prior to your first time showering. If there is any recent drainage on the dressing then you should place a new dry gauze dressing after showering. Leave steri strips on until they fall off on their own or until you return to clinic 2 weeks postop. It is okay to shower and get your wound wet, just do not let the water spray directly on your incision. Do not soak in a bathtub or swimming pool until you are told it is okay to do so when you return to clinic. The sutures are all buried under the skin and will dissolve on their own with time.    - Walking is your main physical therapy for now - we generally will not order PT unless it is determined at a later date in clinic that this is necessary. You should generally try to do at least short walks several times daily.    - If you are prescribed narcotic pain medications (Oxycodone, Norco, Percocet, Tylenol #3, Dilaudid, etc) then you should try to wean off of them as tolerated. These are AS NEEDED medications, so if you are not having significant pain you should try to take fewer pills at a time or spread out the doses out over a longer period of time than is written on the prescription. As an example  if you are prescribed 1-2 pills of pain medication every 4 hours AS NEEDED for pain, then you should begin taking only 1 pill every 4 hours as your pain tolerates. Then when 1 pill is giving good pain relief you should try to spread the doses out longer than 4 hours apart as you can tolerate it.    - You should avoid taking any more pain medications than is written on the prescription. In the event that you run out of the pills prior to when you should based on the written instructions, it is likely that your insurance provider will deny paying for a refill early.    - If your pain pill contains Tylenol (i.e. Percocet, Norco, Tylenol #3) and you are also taking additional Tylenol/acetaminophen as a pain medication, ensure that you are not taking too much tylenol. Prescription narcotic medications that contain Tylenol usually have 325mg of Tylenol per pill and over-the-counter medications have variable dose of Tylenol. You should not exceed 4,000mg of Tylenol in a 24-hour period.    Call the office if you have any questions/concerns or are experiencing the following:  - increasing drainage from the incision  - foul-smelling or malodorous drainage from the incision  - increasing pain not controlled by your prescribed medications  - inability to urinate  - new onset of weakness, numbness or severe pain in the extremities  - bowel/bladder incontinence  - Fever greater than 101.5 degrees  - Nausea/vomitting causing inability to eat food or medications    During normal business hours 7:30AM to 5:00PM on Monday-Friday you can reach my clinical assistant Brissa at [phone] and after hours you can reach the call-center for on-call physician at [phone]                          Same Day Surgery Discharge Instructions  Special Precautions After Surgery - Adult    1. It is not unusual to feel lightheaded or faint, up to 24 hours after surgery or while taking pain medication.  If you have these symptoms; sit for a  few minutes before standing and have someone assist you when getting up.  2. You should rest and relax for the next 24 hours and must have someone stay with you for at least 24 hours after your discharge.  3. DO NOT DRIVE any vehicle or operate mechanical equipment for 24 hours following the end of your surgery.  DO NOT DRIVE while taking narcotic pain medications that have been prescribed by your physician.  If you had a limb operated on, you must be able to use it fully to drive.  4. DO NOT drink alcoholic beverages for 24 hours following surgery or while taking prescription pain medication.  5. Drink clear liquids (apple juice, ginger ale, broth, 7-Up, etc.).  Progress to your regular diet as you feel able.  6. Any questions call your physician and do not make important decisions for 24 hours.    __________________________________________________________________________________________________________________________________  IMPORTANT NUMBERS:    [facility] Main Number:  [phone], [phone]  Pharmacy:  [phone]  Same Day Surgery:  [phone], Monday - Friday until 8:30 p.m.  [facility]:  [phone]

## 2020-10-15 DIAGNOSIS — Z11.59 ENCOUNTER FOR SCREENING FOR OTHER VIRAL DISEASES: Primary | ICD-10-CM

## 2020-10-18 DIAGNOSIS — Z11.59 ENCOUNTER FOR SCREENING FOR OTHER VIRAL DISEASES: ICD-10-CM

## 2020-10-18 PROCEDURE — U0003 INFECTIOUS AGENT DETECTION BY NUCLEIC ACID (DNA OR RNA); SEVERE ACUTE RESPIRATORY SYNDROME CORONAVIRUS 2 (SARS-COV-2) (CORONAVIRUS DISEASE [COVID-19]), AMPLIFIED PROBE TECHNIQUE, MAKING USE OF HIGH THROUGHPUT TECHNOLOGIES AS DESCRIBED BY CMS-2020-01-R: HCPCS | Performed by: ORTHOPAEDIC SURGERY

## 2020-10-19 LAB
SARS-COV-2 RNA SPEC QL NAA+PROBE: NOT DETECTED
SPECIMEN SOURCE: NORMAL

## 2020-10-20 ENCOUNTER — ANESTHESIA EVENT (OUTPATIENT)
Dept: SURGERY | Facility: CLINIC | Age: 55
End: 2020-10-20
Payer: COMMERCIAL

## 2020-10-20 RX ORDER — ACETAMINOPHEN 325 MG/1
975 TABLET ORAL ONCE
Status: CANCELLED | OUTPATIENT
Start: 2020-10-20 | End: 2020-10-20

## 2020-10-20 ASSESSMENT — LIFESTYLE VARIABLES: TOBACCO_USE: 1

## 2020-10-20 NOTE — ANESTHESIA PREPROCEDURE EVALUATION
Anesthesia Pre-Procedure Evaluation    Patient: Nan Keller   MRN: [id number] : 1965          Preoperative Diagnosis: Lumbar disc herniation [M51.26]    Procedure(s):  Left lumbar 5 sacral 1 discectomy.    Past Medical History:   Diagnosis Date     PONV (postoperative nausea and vomiting)      Past Surgical History:   Procedure Laterality Date     BUNIONECTOMY CHEVRON  2/3/2012    Procedure:BUNIONECTOMY CHEVRON; Chevron Osteotomy Bunionectomy Right Foot; Surgeon:BOSTON BUSTAMANTE; Location:[facility] OR     DISCECTOMY LUMBAR POSTERIOR MICROSCOPIC ONE LEVEL N/A 3/4/2020    Procedure: Left Lumbar 5 Sacral 1 Discectomy.;  Surgeon: Sudhir Lala MD;  Location: [facility] OR     EXCISE POLYP RECTUM N/A 9/15/2017    Procedure: EXCISE POLYP RECTUM;  Transanal Excision of Rectal Polyp;  Surgeon: Carmelita Mccrudy MD;  Location: [facility] OR     LAMINECTOMY LUMBAR POSTERIOR MICROSCOPIC ONE LEVEL N/A 2016    Procedure: LAMINECTOMY LUMBAR POSTERIOR MICROSCOPIC ONE LEVEL;  Surgeon: Peng Polanco MD;  Location: [facility] OR     SIGMOIDOSCOPY FLEXIBLE N/A 2017    Procedure: SIGMOIDOSCOPY FLEXIBLE;;  Surgeon: Carmelita Mccurdy MD;  Location: [facility] GI       Anesthesia Evaluation     . Pt has had prior anesthetic. Type: General and MAC    History of anesthetic complications   - PONV        ROS/MED HX    ENT/Pulmonary:     (+)allergic rhinitis, tobacco use, Past use Intermittent asthma Treatment: Inhaler prn,  , . .    Neurologic:  - neg neurologic ROS     Cardiovascular:  - neg cardiovascular ROS       METS/Exercise Tolerance:  >4 METS   Hematologic:  - neg hematologic  ROS       Musculoskeletal: Comment: S/p lumbar lami  and discectomy 3/2020  (+)  other musculoskeletal- Lumbago      GI/Hepatic:     (+) Other GI/Hepatic Umbilical hernia      Renal/Genitourinary:  - ROS Renal section negative       Endo:     (+) Obesity, .      Psychiatric:  - neg psychiatric ROS       Infectious Disease:  - neg infectious disease ROS        Malignancy:      - no malignancy   Other:    - neg other ROS                        Physical Exam  Normal systems: cardiovascular, pulmonary and dental    Airway   Mallampati: I  TM distance: >3 FB  Neck ROM: full    Dental     Cardiovascular       Pulmonary             Lab Results   Component Value Date    HCG Negative 12/27/2005       Preop Vitals  BP Readings from Last 3 Encounters:   03/04/20 128/72   09/15/17 106/57   06/19/17 99/61    Pulse Readings from Last 3 Encounters:   03/04/20 88   09/15/17 57   06/19/17 77      Resp Readings from Last 3 Encounters:   03/04/20 14   09/15/17 16   06/19/17 21    SpO2 Readings from Last 3 Encounters:   03/04/20 93%   09/15/17 99%   06/19/17 95%      Temp Readings from Last 1 Encounters:   03/04/20 36.3  C (97.3  F) (Oral)    Ht Readings from Last 1 Encounters:   09/15/17 1.524 m (5')      Wt Readings from Last 1 Encounters:   03/04/20 75.8 kg (167 lb)    Estimated body mass index is 32.61 kg/m  as calculated from the following:    Height as of 9/15/17: 1.524 m (5').    Weight as of 3/4/20: 75.8 kg (167 lb).       Anesthesia Plan      History & Physical Review  History and physical reviewed and following examination; no interval change.    ASA Status:  2 .    NPO Status:  > 8 hours    Plan for General and ETT with Intravenous and Propofol induction. Maintenance will be Balanced.    PONV prophylaxis:  Ondansetron (or other 5HT-3) and Dexamethasone or Solumedrol  Additional equipment: Videolaryngoscope   The patient is not a current smoker      Postoperative Care  Postoperative pain management:  IV analgesics, Oral pain medications and Multi-modal analgesia.      Consents  Anesthetic plan, risks, benefits and alternatives discussed with:  Patient and Spouse.  Use of blood products discussed: No .   .                   JULIAN Alamo CRNA

## 2020-10-21 ENCOUNTER — APPOINTMENT (OUTPATIENT)
Dept: GENERAL RADIOLOGY | Facility: CLINIC | Age: 55
End: 2020-10-21
Attending: ORTHOPAEDIC SURGERY
Payer: COMMERCIAL

## 2020-10-21 ENCOUNTER — HOSPITAL ENCOUNTER (OUTPATIENT)
Facility: CLINIC | Age: 55
Discharge: HOME | End: 2020-10-21
Attending: ORTHOPAEDIC SURGERY | Admitting: ORTHOPAEDIC SURGERY
Payer: COMMERCIAL

## 2020-10-21 ENCOUNTER — ANESTHESIA (OUTPATIENT)
Dept: SURGERY | Facility: CLINIC | Age: 55
End: 2020-10-21
Payer: COMMERCIAL

## 2020-10-21 VITALS
HEART RATE: 77 BPM | DIASTOLIC BLOOD PRESSURE: 80 MMHG | OXYGEN SATURATION: 92 % | TEMPERATURE: 98.1 F | SYSTOLIC BLOOD PRESSURE: 130 MMHG | RESPIRATION RATE: 20 BRPM

## 2020-10-21 DIAGNOSIS — Z98.890 S/P LUMBAR DISCECTOMY: Primary | ICD-10-CM

## 2020-10-21 PROCEDURE — 258N000003 HC RX IP 258 OP 636: Performed by: NURSE ANESTHETIST, CERTIFIED REGISTERED

## 2020-10-21 PROCEDURE — 250N000011 HC RX IP 250 OP 636: Performed by: NURSE ANESTHETIST, CERTIFIED REGISTERED

## 2020-10-21 PROCEDURE — 370N000002 HC ANESTHESIA TECHNICAL FEE, EACH ADDTL 15 MIN: Performed by: ORTHOPAEDIC SURGERY

## 2020-10-21 PROCEDURE — 999N000135 HC STATISTIC PRE PROC ASSESS I: Performed by: ORTHOPAEDIC SURGERY

## 2020-10-21 PROCEDURE — 250N000009 HC RX 250: Performed by: NURSE ANESTHETIST, CERTIFIED REGISTERED

## 2020-10-21 PROCEDURE — 272N000001 HC OR GENERAL SUPPLY STERILE: Performed by: ORTHOPAEDIC SURGERY

## 2020-10-21 PROCEDURE — 250N000013 HC RX MED GY IP 250 OP 250 PS 637: Performed by: NURSE ANESTHETIST, CERTIFIED REGISTERED

## 2020-10-21 PROCEDURE — 999N000179 XR SURGERY CARM FLUORO LESS THAN 5 MIN W STILLS

## 2020-10-21 PROCEDURE — 250N000011 HC RX IP 250 OP 636: Performed by: ORTHOPAEDIC SURGERY

## 2020-10-21 PROCEDURE — 272N000004 HC RX 272: Performed by: ORTHOPAEDIC SURGERY

## 2020-10-21 PROCEDURE — 250N000003 HC SEVOFLURANE, EA 15 MIN: Performed by: ORTHOPAEDIC SURGERY

## 2020-10-21 PROCEDURE — 761N000007 HC RECOVERY PHASE 2 EACH 15 MINS: Performed by: ORTHOPAEDIC SURGERY

## 2020-10-21 PROCEDURE — 360N000027 HC SURGERY LEVEL 4 EA 15 ADDTL MIN: Performed by: ORTHOPAEDIC SURGERY

## 2020-10-21 PROCEDURE — 761N000001 HC RECOVERY PHASE 1 LEVEL 1 FIRST HR: Performed by: ORTHOPAEDIC SURGERY

## 2020-10-21 PROCEDURE — 271N000001 HC OR GENERAL SUPPLY NON-STERILE: Performed by: ORTHOPAEDIC SURGERY

## 2020-10-21 PROCEDURE — 360N000030 HC SURGERY LEVEL 4 W FLUORO 1ST 30 MIN: Performed by: ORTHOPAEDIC SURGERY

## 2020-10-21 PROCEDURE — 370N000001 HC ANESTHESIA TECHNICAL FEE, 1ST 30 MIN: Performed by: ORTHOPAEDIC SURGERY

## 2020-10-21 PROCEDURE — 250N000013 HC RX MED GY IP 250 OP 250 PS 637: Performed by: ORTHOPAEDIC SURGERY

## 2020-10-21 RX ORDER — SODIUM CHLORIDE, SODIUM LACTATE, POTASSIUM CHLORIDE, CALCIUM CHLORIDE 600; 310; 30; 20 MG/100ML; MG/100ML; MG/100ML; MG/100ML
INJECTION, SOLUTION INTRAVENOUS CONTINUOUS
Status: DISCONTINUED | OUTPATIENT
Start: 2020-10-21 | End: 2020-10-21 | Stop reason: HOSPADM

## 2020-10-21 RX ORDER — HYDROCODONE BITARTRATE AND ACETAMINOPHEN 5; 325 MG/1; MG/1
1-2 TABLET ORAL EVERY 4 HOURS PRN
Qty: 25 TABLET | Refills: 0 | Status: SHIPPED | OUTPATIENT
Start: 2020-10-21

## 2020-10-21 RX ORDER — HYDROXYZINE HYDROCHLORIDE 25 MG/1
25 TABLET, FILM COATED ORAL EVERY 6 HOURS PRN
Status: DISCONTINUED | OUTPATIENT
Start: 2020-10-21 | End: 2020-10-21 | Stop reason: HOSPADM

## 2020-10-21 RX ORDER — ONDANSETRON 4 MG/1
4 TABLET, ORALLY DISINTEGRATING ORAL EVERY 30 MIN PRN
Status: DISCONTINUED | OUTPATIENT
Start: 2020-10-21 | End: 2020-10-21 | Stop reason: HOSPADM

## 2020-10-21 RX ORDER — FENTANYL CITRATE 50 UG/ML
25-50 INJECTION, SOLUTION INTRAMUSCULAR; INTRAVENOUS EVERY 5 MIN PRN
Status: DISCONTINUED | OUTPATIENT
Start: 2020-10-21 | End: 2020-10-21 | Stop reason: HOSPADM

## 2020-10-21 RX ORDER — PROPOFOL 10 MG/ML
INJECTION, EMULSION INTRAVENOUS PRN
Status: DISCONTINUED | OUTPATIENT
Start: 2020-10-21 | End: 2020-10-21

## 2020-10-21 RX ORDER — KETAMINE HYDROCHLORIDE 10 MG/ML
INJECTION, SOLUTION INTRAMUSCULAR; INTRAVENOUS PRN
Status: DISCONTINUED | OUTPATIENT
Start: 2020-10-21 | End: 2020-10-21

## 2020-10-21 RX ORDER — ACETAMINOPHEN 325 MG/1
975 TABLET ORAL ONCE
Status: COMPLETED | OUTPATIENT
Start: 2020-10-21 | End: 2020-10-21

## 2020-10-21 RX ORDER — HYDROXYZINE HYDROCHLORIDE 50 MG/1
50 TABLET, FILM COATED ORAL EVERY 6 HOURS PRN
Status: DISCONTINUED | OUTPATIENT
Start: 2020-10-21 | End: 2020-10-21 | Stop reason: HOSPADM

## 2020-10-21 RX ORDER — GABAPENTIN 300 MG/1
300 CAPSULE ORAL ONCE
Status: COMPLETED | OUTPATIENT
Start: 2020-10-21 | End: 2020-10-21

## 2020-10-21 RX ORDER — SCOLOPAMINE TRANSDERMAL SYSTEM 1 MG/1
1 PATCH, EXTENDED RELEASE TRANSDERMAL ONCE
Status: DISCONTINUED | OUTPATIENT
Start: 2020-10-21 | End: 2020-10-21 | Stop reason: HOSPADM

## 2020-10-21 RX ORDER — MEPERIDINE HYDROCHLORIDE 25 MG/ML
12.5 INJECTION INTRAMUSCULAR; INTRAVENOUS; SUBCUTANEOUS EVERY 5 MIN PRN
Status: DISCONTINUED | OUTPATIENT
Start: 2020-10-21 | End: 2020-10-21 | Stop reason: HOSPADM

## 2020-10-21 RX ORDER — ONDANSETRON 2 MG/ML
INJECTION INTRAMUSCULAR; INTRAVENOUS PRN
Status: DISCONTINUED | OUTPATIENT
Start: 2020-10-21 | End: 2020-10-21

## 2020-10-21 RX ORDER — HYDROXYZINE HYDROCHLORIDE 25 MG/1
25 TABLET, FILM COATED ORAL
Status: DISCONTINUED | OUTPATIENT
Start: 2020-10-21 | End: 2020-10-21 | Stop reason: HOSPADM

## 2020-10-21 RX ORDER — PROPOFOL 10 MG/ML
INJECTION, EMULSION INTRAVENOUS CONTINUOUS PRN
Status: DISCONTINUED | OUTPATIENT
Start: 2020-10-21 | End: 2020-10-21

## 2020-10-21 RX ORDER — IBUPROFEN 600 MG/1
600 TABLET, FILM COATED ORAL ONCE
Status: COMPLETED | OUTPATIENT
Start: 2020-10-21 | End: 2020-10-21

## 2020-10-21 RX ORDER — FENTANYL CITRATE 50 UG/ML
INJECTION, SOLUTION INTRAMUSCULAR; INTRAVENOUS PRN
Status: DISCONTINUED | OUTPATIENT
Start: 2020-10-21 | End: 2020-10-21

## 2020-10-21 RX ORDER — LIDOCAINE 40 MG/G
CREAM TOPICAL
Status: DISCONTINUED | OUTPATIENT
Start: 2020-10-21 | End: 2020-10-21 | Stop reason: HOSPADM

## 2020-10-21 RX ORDER — HYDROXYZINE HYDROCHLORIDE 25 MG/1
25 TABLET, FILM COATED ORAL EVERY 6 HOURS PRN
Qty: 30 TABLET | Refills: 0 | Status: SHIPPED | OUTPATIENT
Start: 2020-10-21

## 2020-10-21 RX ORDER — BUPIVACAINE HYDROCHLORIDE 2.5 MG/ML
INJECTION, SOLUTION INFILTRATION; PERINEURAL PRN
Status: DISCONTINUED | OUTPATIENT
Start: 2020-10-21 | End: 2020-10-21 | Stop reason: HOSPADM

## 2020-10-21 RX ORDER — CEFAZOLIN SODIUM 1 G/50ML
1 INJECTION, SOLUTION INTRAVENOUS SEE ADMIN INSTRUCTIONS
Status: DISCONTINUED | OUTPATIENT
Start: 2020-10-21 | End: 2020-10-21 | Stop reason: HOSPADM

## 2020-10-21 RX ORDER — HYDROCODONE BITARTRATE AND ACETAMINOPHEN 5; 325 MG/1; MG/1
1-2 TABLET ORAL EVERY 4 HOURS PRN
Status: COMPLETED | OUTPATIENT
Start: 2020-10-21 | End: 2020-10-21

## 2020-10-21 RX ORDER — DEXAMETHASONE SODIUM PHOSPHATE 4 MG/ML
INJECTION, SOLUTION INTRA-ARTICULAR; INTRALESIONAL; INTRAMUSCULAR; INTRAVENOUS; SOFT TISSUE PRN
Status: DISCONTINUED | OUTPATIENT
Start: 2020-10-21 | End: 2020-10-21

## 2020-10-21 RX ORDER — CEFAZOLIN SODIUM 2 G/100ML
2 INJECTION, SOLUTION INTRAVENOUS
Status: COMPLETED | OUTPATIENT
Start: 2020-10-21 | End: 2020-10-21

## 2020-10-21 RX ORDER — NALOXONE HYDROCHLORIDE 0.4 MG/ML
.1-.4 INJECTION, SOLUTION INTRAMUSCULAR; INTRAVENOUS; SUBCUTANEOUS
Status: DISCONTINUED | OUTPATIENT
Start: 2020-10-21 | End: 2020-10-21 | Stop reason: HOSPADM

## 2020-10-21 RX ORDER — ONDANSETRON 2 MG/ML
4 INJECTION INTRAMUSCULAR; INTRAVENOUS EVERY 30 MIN PRN
Status: DISCONTINUED | OUTPATIENT
Start: 2020-10-21 | End: 2020-10-21 | Stop reason: HOSPADM

## 2020-10-21 RX ORDER — CEPHALEXIN 500 MG/1
1000 CAPSULE ORAL 3 TIMES DAILY
Qty: 6 CAPSULE | Refills: 0 | Status: SHIPPED | OUTPATIENT
Start: 2020-10-21 | End: 2020-10-22

## 2020-10-21 RX ORDER — GLYCOPYRROLATE 0.2 MG/ML
INJECTION, SOLUTION INTRAMUSCULAR; INTRAVENOUS PRN
Status: DISCONTINUED | OUTPATIENT
Start: 2020-10-21 | End: 2020-10-21

## 2020-10-21 RX ORDER — LIDOCAINE HYDROCHLORIDE 10 MG/ML
INJECTION, SOLUTION INFILTRATION; PERINEURAL PRN
Status: DISCONTINUED | OUTPATIENT
Start: 2020-10-21 | End: 2020-10-21

## 2020-10-21 RX ADMIN — GABAPENTIN 300 MG: 300 CAPSULE ORAL at 07:01

## 2020-10-21 RX ADMIN — SODIUM CHLORIDE, POTASSIUM CHLORIDE, SODIUM LACTATE AND CALCIUM CHLORIDE: 600; 310; 30; 20 INJECTION, SOLUTION INTRAVENOUS at 10:46

## 2020-10-21 RX ADMIN — SCOPALAMINE 1 PATCH: 1 PATCH, EXTENDED RELEASE TRANSDERMAL at 07:02

## 2020-10-21 RX ADMIN — DEXAMETHASONE SODIUM PHOSPHATE 10 MG: 4 INJECTION, SOLUTION INTRA-ARTICULAR; INTRALESIONAL; INTRAMUSCULAR; INTRAVENOUS; SOFT TISSUE at 07:42

## 2020-10-21 RX ADMIN — FENTANYL CITRATE 100 MCG: 50 INJECTION, SOLUTION INTRAMUSCULAR; INTRAVENOUS at 09:56

## 2020-10-21 RX ADMIN — LIDOCAINE HYDROCHLORIDE 100 MG: 10 INJECTION, SOLUTION INFILTRATION; PERINEURAL at 07:42

## 2020-10-21 RX ADMIN — PROPOFOL 75 MCG/KG/MIN: 10 INJECTION, EMULSION INTRAVENOUS at 07:58

## 2020-10-21 RX ADMIN — FENTANYL CITRATE 150 MCG: 50 INJECTION, SOLUTION INTRAMUSCULAR; INTRAVENOUS at 08:07

## 2020-10-21 RX ADMIN — ACETAMINOPHEN 975 MG: 325 TABLET, FILM COATED ORAL at 07:02

## 2020-10-21 RX ADMIN — KETAMINE HYDROCHLORIDE 50 MG: 10 INJECTION INTRAMUSCULAR; INTRAVENOUS at 09:41

## 2020-10-21 RX ADMIN — GLYCOPYRROLATE 0.2 MG: 0.2 INJECTION, SOLUTION INTRAMUSCULAR; INTRAVENOUS at 07:42

## 2020-10-21 RX ADMIN — FENTANYL CITRATE 100 MCG: 50 INJECTION, SOLUTION INTRAMUSCULAR; INTRAVENOUS at 07:42

## 2020-10-21 RX ADMIN — PROPOFOL 200 MG: 10 INJECTION, EMULSION INTRAVENOUS at 07:42

## 2020-10-21 RX ADMIN — ONDANSETRON 4 MG: 2 INJECTION INTRAMUSCULAR; INTRAVENOUS at 10:18

## 2020-10-21 RX ADMIN — IBUPROFEN 600 MG: 600 TABLET ORAL at 13:38

## 2020-10-21 RX ADMIN — CEFAZOLIN SODIUM 1 G: 2 INJECTION, SOLUTION INTRAVENOUS at 09:32

## 2020-10-21 RX ADMIN — KETAMINE HYDROCHLORIDE 50 MG: 10 INJECTION INTRAMUSCULAR; INTRAVENOUS at 07:58

## 2020-10-21 RX ADMIN — FENTANYL CITRATE 50 MCG: 50 INJECTION, SOLUTION INTRAMUSCULAR; INTRAVENOUS at 11:18

## 2020-10-21 RX ADMIN — FENTANYL CITRATE 50 MCG: 50 INJECTION, SOLUTION INTRAMUSCULAR; INTRAVENOUS at 11:34

## 2020-10-21 RX ADMIN — HYDROXYZINE HYDROCHLORIDE 50 MG: 50 TABLET, FILM COATED ORAL at 11:15

## 2020-10-21 RX ADMIN — LIDOCAINE HYDROCHLORIDE 1 ML: 10 INJECTION, SOLUTION EPIDURAL; INFILTRATION; INTRACAUDAL; PERINEURAL at 07:05

## 2020-10-21 RX ADMIN — CEFAZOLIN SODIUM 2 G: 2 INJECTION, SOLUTION INTRAVENOUS at 07:32

## 2020-10-21 RX ADMIN — ROCURONIUM BROMIDE 50 MG: 10 INJECTION INTRAVENOUS at 07:42

## 2020-10-21 RX ADMIN — SODIUM CHLORIDE, POTASSIUM CHLORIDE, SODIUM LACTATE AND CALCIUM CHLORIDE: 600; 310; 30; 20 INJECTION, SOLUTION INTRAVENOUS at 07:05

## 2020-10-21 RX ADMIN — HYDROCODONE BITARTRATE AND ACETAMINOPHEN 1 TABLET: 5; 325 TABLET ORAL at 13:00

## 2020-10-21 RX ADMIN — MIDAZOLAM 2 MG: 1 INJECTION INTRAMUSCULAR; INTRAVENOUS at 07:32

## 2020-10-21 SDOH — HEALTH STABILITY: MENTAL HEALTH: CURRENT SMOKER: 0

## 2020-10-21 NOTE — ANESTHESIA POSTPROCEDURE EVALUATION
Patient: Nan Keller    Procedure(s):  Revision Left Lumbar 4-5 partial Discectomy, Left Lumbar 3-4 Discectomy    Diagnosis:Lumbar disc herniation [M51.26]  Diagnosis Additional Information: No value filed.    Anesthesia Type:  General, ETT    Note:  Anesthesia Post Evaluation    Patient location during evaluation: Bedside  Patient participation: Able to fully participate in evaluation  Level of consciousness: awake and alert  Pain management: adequate  Airway patency: patent  Cardiovascular status: acceptable  Respiratory status: acceptable  Hydration status: acceptable  PONV: none     Anesthetic complications: None          Last vitals:  Vitals:    10/21/20 1145 10/21/20 1200 10/21/20 1230   BP: 132/81 132/84 130/80   Pulse: 71 91 77   Resp: 12 20 20   Temp: 36.7  C (98.1  F)     SpO2: 97% 91% 92%         Electronically Signed By: JULIAN Nguyen CRNA  October 21, 2020  2:27 PM

## 2020-10-21 NOTE — BRIEF OP NOTE
[facility]     Brief Operative Note    Pre-operative diagnosis: Lumbar disc herniation [M51.26]  Post-operative diagnosis Same as pre-operative diagnosis    Procedure: Procedure(s):  Revision Left Lumbar 4-5 partial Discectomy, Left Lumbar 3-4 Discectomy  Surgeon: Surgeon(s) and Role:     * Sudhir Lala MD - Primary     * Daniel Shin PA-C - Assisting  Anesthesia: General   Estimated blood loss: 30mL  Drains: None  Specimens: * No specimens in log *  Findings:   L3-4 disc hernation. Recurrent left L4-5 disc herniation.  Complications: None.  Implants: * No implants in log *

## 2020-10-21 NOTE — ANESTHESIA CARE TRANSFER NOTE
Patient: Nan Keller    Procedure(s):  Revision Left Lumbar 4-5 partial Discectomy, Left Lumbar 3-4 Discectomy    Diagnosis: Lumbar disc herniation [M51.26]  Diagnosis Additional Information: No value filed.    Anesthesia Type:   General, ETT     Note:  Airway :Face Mask  Patient transferred to:PACU  Comments: Patient's VSS. Spontaneous respirations. Patient arousable. IV patent. Report to RN.Handoff Report: Identifed the Patient, Identified the Reponsible Provider, Reviewed the pertinent medical history, Discussed the surgical course, Reviewed Intra-OP anesthesia mangement and issues during anesthesia, Set expectations for post-procedure period and Allowed opportunity for questions and acknowledgement of understanding      Vitals: (Last set prior to Anesthesia Care Transfer)    CRNA VITALS  10/21/2020 1018 - 10/21/2020 1048      10/21/2020             Pulse:  88    SpO2:  98 %    Resp Rate (observed):  (!) 5                Electronically Signed By: JULIAN Nguyen CRNA  October 21, 2020  10:48 AM

## 2020-10-21 NOTE — DISCHARGE INSTRUCTIONS
Same Day Surgery Discharge Instructions  Special Precautions After Surgery - Adult    1. It is not unusual to feel lightheaded or faint, up to 24 hours after surgery or while taking pain medication.  If you have these symptoms; sit for a few minutes before standing and have someone assist you when getting up.  2. You should rest and relax for the next 24 hours and must have someone stay with you for at least 24 hours after your discharge.  3. DO NOT DRIVE any vehicle or operate mechanical equipment for 24 hours following the end of your surgery.  DO NOT DRIVE while taking narcotic pain medications that have been prescribed by your physician.  If you had a limb operated on, you must be able to use it fully to drive.  4. DO NOT drink alcoholic beverages for 24 hours following surgery or while taking prescription pain medication.  5. Drink clear liquids (apple juice, ginger ale, broth, 7-Up, etc.).  Progress to your regular diet as you feel able.  6. Any questions call your physician and do not make important decisions for 24 hours.    ACTIVITY  ? Per Dr. Lala's instructions     INCISIONAL CARE  ? May shower after 24 hours.  ? Apply ice for comfort, 1/2 hour on and 1/2 hour off for first 48 hours.  ? Be alert for signs of infection:  redness, swelling, heat, drainage of pus, and/or elevated temperature.  Contact your doctor if these occur.        Call for an appointment to return to the clinic to see Dr. Lala in 3 weeks    Medications:  ? Hydrocodone 5/325mg tabs as directed for moderate to severe pain.  (Norco, Vicodin):  Next dose due at 5:00pm  ? Hydroxyzine 25mg tabs (Vistaril):  Next dose due at 6:00pm  ? Ibuprofen 600mg(Motrin, Advil):  Next dose due at 7:30pm  ? Cephalexin (Keflex) as directed.  ? Stool softener to avoid constipation.  ? Follow the instructions on the bottle.        __________________________________________________________________________________________________________________________________  IMPORTANT NUMBERS:    [facility] Main Number:  [phone], [phone]  Pharmacy:  [phone]  Same Day Surgery:  [phone], Monday - Friday until 8:30 p.m.  Urgent Care:  [phone]  Emergency Room:  [phone]      [facility]:  [phone]                                                                             [facility]:  [phone]

## 2020-10-21 NOTE — ANESTHESIA PROCEDURE NOTES
Airway   Date/Time: 10/21/2020 7:45 AM   Patient location during procedure: OR    Staff -   CRNA: Mandy Ritchie APRN CRNA  Performed By: CRNA    Consent for Airway   Urgency: elective    Indications and Patient Condition  Indications for airway management: william-procedural  Induction type:intravenousMask difficulty assessment: 1 - vent by mask    Final Airway Details  Final airway type: endotracheal airway  Successful airway:ETT - single  Endotracheal Airway Details   ETT size (mm): 7.0  Cuffed: yes  Cuff volume (mL): 7  Successful intubation technique: direct laryngoscopy  Grade View of Cords: 1  Adjucts: stylet  Measured from: gums/teeth  Secured at (cm): 21  Secured with: plastic tape  Bite block used: None    Post intubation assessment   Placement verified by: capnometry, equal breath sounds and chest rise   Number of attempts at approach: 1  Number of other approaches attempted: 0  Secured with:plastic tape  Ease of procedure: easy  Dentition: Intact and Unchanged

## 2021-01-15 ENCOUNTER — HEALTH MAINTENANCE LETTER (OUTPATIENT)
Age: 56
End: 2021-01-15

## 2021-09-05 ENCOUNTER — HEALTH MAINTENANCE LETTER (OUTPATIENT)
Age: 56
End: 2021-09-05

## 2021-10-31 ENCOUNTER — HEALTH MAINTENANCE LETTER (OUTPATIENT)
Age: 56
End: 2021-10-31

## 2022-02-20 ENCOUNTER — HEALTH MAINTENANCE LETTER (OUTPATIENT)
Age: 57
End: 2022-02-20

## 2022-10-23 ENCOUNTER — HEALTH MAINTENANCE LETTER (OUTPATIENT)
Age: 57
End: 2022-10-23

## 2023-04-01 ENCOUNTER — HEALTH MAINTENANCE LETTER (OUTPATIENT)
Age: 58
End: 2023-04-01

## 2023-07-03 NOTE — TELEPHONE ENCOUNTER
Called to check on patient after her procedure on Friday. She is doing well. Pain is well controlled. She is having small, soft bowel movements but feels they are not big enough. Recommended she try taking a dose of Miralax and continue on daily stool softener. She is eating and drinking without difficulty and denies any fevers or chills. Minimal bleeding. Pathology is not back yet. Offered her a follow up visit in one week but she would prefer to wait a few weeks and only come in once to see Dr. Mccurdy as she is doing well. Will have our  call her to set this up. Encouraged the patient to contact the clinic in the meantime with any questions or concerns.    JULIAN Corbett, NP-C  Colon and Rectal Surgery  [facility]     Cantharidin Counseling:  I discussed with the patient the risks of Cantharidin including but not limited to pain, redness, burning, itching, and blistering.

## 2023-11-05 ENCOUNTER — HEALTH MAINTENANCE LETTER (OUTPATIENT)
Age: 58
End: 2023-11-05

## 2025-08-06 NOTE — OP NOTE
Called and left Jenna CISNEROS on a secure M asking for a call back to discuss patient's Diagnostic Clinic referral.   Orthopedic  Operative Note    Pre-operative diagnosis: Lumbar disc herniation [M51.26]    Post-operative diagnosis: 1) L3-4 disc herniation with lateral recess stenosis   2) Recurrent left L4-5 disc herniation   3) Prior left L4-5 discectomy and left L5-S1 discectomy    Procedure: 1) Left L3-4 discectomy   2) Revision left L4-5 partial discectomy   3) Use of intraoperative microscope    Surgeon: Sudhir Lala MD    Assistant(s): Daniel Martini PA-C. Mr. Martini is an experienced surgical fist assistant whose assistance was necessary for patient positioning, exposure, hemostasis, soft tissue and neural retraction, closure.    Anesthesia: General endotracheal anesthesia and local injection of 0.25% plain marcaine in skin and paraspinal muscles    Estimated blood loss: 30mL     Drains: None    Specimens: None    Indications:                               Nan is a prior patient of mine as well as my retired partner Dr. Polanco. She has history of two prior discectomy surgeries. Both of these with initially good results. She had radicular leg pain in L5 pattern initially with her prior disc herniations. Her most recent surgery was a left L5-S1 discectomy which I performed in early march of this year. Initially after that surgery she did fairly well until sometime in April when she started having pain again in the buttock. It eventually migrated such that it was primarily in the groin and anterior thigh which would be atypically for an L5 radiculopathy. Then she did develop some buttock pain again, but not classic L5 radicular pattern as previous. An updated MRI was done that showed she had a recurrent disc herniation at L4-5 that caudally extruded behind the L5 vertebral body in the lateral recess and caused impingement on the L5 nerve. In addition to this she had a new disc herniation at L3-4 which was causing left greater than right lateral recess stenosis. It was unclear how much the latter was contributing. 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 She was evaluated by two different surgeons who treat hip pathology and had an intra-articular left hip injection which unfortunately did not provide any relief. It was not though by them that the hip was the culprit for her pain and that hip arthroscopy was not likely to improve her symptoms. Based on her response to the HERNAN and the fact that she had new findings on her lumbar MRI we discussed that she would be a candidate for attempt at revision discectomy L4-5 and also discectomy vs lateral recess decompression at L3-4. She elected to proceed based on having symptoms for several months without relief from traditional conservative care.    I discussed the risks of surgery with the the patient. There is the risk of general anesthesia, which is a risk for heart attach, stroke, respiratory compromise, death. There is a risk of all surgeries being bleeding or infection. In the case of spine surgery either of these could necessitate return to the OR in worst case scenario. There is a risk of recurrent disc herniation or failure to completely alleviate current symptoms. Also a risk of nerve root injury, temporary or permanent, which could cause pain, paresthesias/dysesthesias or weakness. A risk for dural tear with persistent CSF leak. Having had the discussion above, the patient appears to understand the risks, intended outcomes, postoperative course and agrees to proceed with surgery.     Findings: Chronic appearing recurrent left L4-5 caudally extruded disc herniation. L3-4 central to left paracentral disc herniation with lateral recess stenosis. Abundant scar tissue    Complications: None appreciated intraoperatively     Procedure Detail: Nan was seen in the preoperative holding area. She was again given the opportunity to ask questions regarding procedural detail, risks and benefits, expected  post-operative recovery. All questions were answered and informed consent was signed. The left low back was marked with indelible ink at the anticipated level based on her prior surgical scars. The patient was then transferred back to the operative suite on a gurney. After satisfactory general anesthesia, the patient was carefully placed prone onto the Seun frame. All bony prominences were well-padded. The back was prepped and draped in the usual sterile manner. Prior to incision, a critical pause was observed to identify the correct patient, correct procedure, correct level and that appropriate imaging studies were available. I also confirmed that the patient received appropriate pre-operative prophylactic antibiotics. The patient had received 2g IV Ancef. All in the room were in agreement.  An 18 gauge spinal needle and its trochar were placed through the skin at the anticipated levels and a lateral C-arm imagine was taken to verify the starting point for the skin incision. A maker was used to yaya out the incision.     The skin and subcutaneous tissue was incised with 10-blade. Further dissection with electrocautery was used to reach the paraspinal fascia.  The fascia was incised and the left side of midline. Garrett and bovie used to subperiosteally dissect the paraspinal muscles off the left of the spine exposing the presumed L3 lamina initially. This was a level that did not have prior surgery. From here I dissected to the L4-5 level with the Garrett. There was abundant scar tissue. I used the Garrett to find the prior laminotomy site of L4 and also the residual aspect of the medial facet.. A metallic instrument was held up to the presumed L3 and L4 hemilaminae and another lateral C-arm image taken to confirm this was correct. A high speed dilma was used to make a permanent yaya on the lamina at each level.      I started initially at L4-5. The microscope was brought in for better visualization. The high speed dilma  was used to enlarge the L4 laminotomy cranially. There was not much residual facet joint based on the pre-op MRI so I tried to leave that intact as much as possible. I also left enough pars of L4 for stability purposes. Once the bone was thinned I used the Kerrison to resect this and some of the residual ligamentum cranially and laterally. I was able to find a virgin tissue plane here above the shoulder of the L5 nerve. I used curette so to dissect scar tissue from the medial aspect of the residual L4-5 facet joint. Unfortunately despite best efforts with a blunt ball probe and Penfield I was unable to retract the nerve over the disc space due to the scar tissue. At this point I decided to try and approach it from the prior L5 laminotomy site. In her prior surgery from March I had resected the entire left hemilamina of L5. I found the residual pars and the ascending articular process of L5. Leaving enough pars for stability purposes I did resect some additional at the cranial aspect as well as the medial aspect of the L5 ascending articular process. Here I was flush with the medial L5 pedicle. I then used the blunt ball probe to bluntly dissect the nerve off the L5 pedicle until I was on the ventral aspect of the canal. Using the ball probe and Penfield 4 I was able to bluntly dissect craniocaudal about 1cm region where I could get a small nerve root retractor in to retract the L5 nerve root medially. I incised the PLL over the disc herniation here. I used the ball probe to try and loosen up fragments of the disc herniation both above and below which was very hard, either calcified disc or herniated endplate cartilage. I was able to remove some of this so I felt flush on the L5 vertabrae. But again at this point I was not able to expose all the way up to the L4-5 disc space. At this point I did not feel I would be able to technically retrieve any more of the disc herniation here and decided to move to the L3-4  interspace.    The retractor was moved to L3-4 and a high speed dilma was then used to create a hemilaminotomy and to initiate a partial medial facetectomy on the left side. Kerrison rongeur was used to remove the ligamentum flavum and additional lamina. The majority of the facet joint and the pars is preserved for stability purposes. I did undercut the L3-4 facet slightly with the kerrison as well. The traversing L4 nerve was identified and was retracted medially with a penfield 4. A nerve root retractor was placed to hold this in place. From the central to left paracentral / lateral recess zone there is contained a paracentral disc herniation. Annulotomy is made with a 15 blade. A ball tipped probe was initially used to remove loose fragments in the subannular space. I then entered the disc space with both straight and upbiting pituitary rongeurs and removed additional nuclear material. I then jet lavaged the disc space. The annulus was not essentially flush with the L3 and L4 and vertebral bodies posteriorly. The traversing nerve and common dural tube was free to mobilize at this level now and was not tented dorsally.  The disk space and epidural space was carefully irrigated and probed, finding no evidence of further impinging disk disease.  Hemostasis was achieved with Floseal and bipolar cautery and the wound was copiously irrigated again. 0.25% Marcaine was injected into the paraspinal muscles and subcutaneous tissue at the surgical site for post-operative pain relief.  The wound was then closed carefully using #1 Vicryl suture in the paraspinal fascia, 2-0 Vicryl in the deep dermal layer and 3-0 monocryl in subcuticular layer.  Steri-Strips and sterile dressings are applied. All sponge and needle counts were correct in the end. The patient appeared to tolerate the procedure well and was escorted the recovery room in satisfactory condition.            Condition: Stable     Weight bearing status: Weight bearing  as tolerated     Activity:          Anticoagulation plan:    Plan:      Sudhir Lala MD  [facility]  Date:  10/21/2020  10:43 AM   Activity as tolerated  Patient may move about with assist as indicated or with supervision  No lifting >10lbs. No excessive bending/twisting.    Ambulation and mechanical prophylaxis.    Discharge when Phase II criteria met. Periop antibiotics. Oral pain medications. Follow up 2-3 weeks.

## (undated) DEVICE — SU VICRYL 4-0 RB-1 27" UND J214H

## (undated) DEVICE — PACK RECTAL KIT CUSTOM ASC

## (undated) DEVICE — ESU ELEC BLADE 4" COATED

## (undated) DEVICE — BASIN SET MINOR DISP

## (undated) DEVICE — DRAPE MAYO STAND 23X54 8337

## (undated) DEVICE — GLOVE PROTEXIS BLUE W/NEU-THERA 8.0  2D73EB80

## (undated) DEVICE — SU MONOCRYL 3-0 PS-2 18" UND Y497G

## (undated) DEVICE — DRAPE LAP W/ARMBOARD 29410

## (undated) DEVICE — GLOVE PROTEXIS BLUE W/NEU-THERA 7.0  2D73EB70

## (undated) DEVICE — SYR 20ML LL W/O NDL

## (undated) DEVICE — NDL 18GA 1.5" 305196

## (undated) DEVICE — TAPE MEDIPORE 4"X10YD 2964

## (undated) DEVICE — GLOVE PROTEXIS MICRO 6.0  2D73PM60

## (undated) DEVICE — SYR 10ML FINGER CONTROL W/O NDL 309695

## (undated) DEVICE — SU VICRYL 1 CTB-1 36" UND JB947

## (undated) DEVICE — ESU CORD BIPOLAR GREEN 10-4000

## (undated) DEVICE — SOL NACL 0.9% IRRIG 1000ML BOTTLE 07138-09

## (undated) DEVICE — SU VICRYL 2-0 OS-6 27" UND J533H

## (undated) DEVICE — NDL SPINAL 18GA 3.5" 405184

## (undated) DEVICE — SPONGE COTTONOID 1/2X1/2" 20-04SW

## (undated) DEVICE — DRSG PRIMAPORE 03 1/8X6" 66000318

## (undated) DEVICE — SUCTION TIP YANKAUER STR K87

## (undated) DEVICE — SOL WATER IRRIG 1000ML BOTTLE 07139-09

## (undated) DEVICE — BONE WAX 2.5GM W31G

## (undated) DEVICE — DRAPE STERI TOWEL SM 1000

## (undated) DEVICE — MIDAS REX DISSECTING TOOL  14MH30

## (undated) DEVICE — DRAPE MICRO ZEISS MD 48"X120CM

## (undated) DEVICE — TUBING SUCTION SIGMOIDOSCOPIC 18FR 6FT

## (undated) DEVICE — GOWN XLG DISP 9545

## (undated) DEVICE — ESU ELEC BLADE 2.75" COATED/INSULATED E1455

## (undated) DEVICE — GLOVE PROTEXIS MICRO 6.5  2D73PM65

## (undated) DEVICE — Device

## (undated) DEVICE — BLANKET BAIR HUGGER UPPER BODY 42268

## (undated) DEVICE — GLOVE PROTEXIS W/NEU-THERA 7.5  2D73TE75

## (undated) DEVICE — LINEN TOWEL PACK X5 5464

## (undated) DEVICE — ADH FLOSEAL W/HUMAN THROMBIN 5ML W/APPLICATOR TIP ADS201844

## (undated) DEVICE — SOL NACL 0.9% IRRIG 500ML BOTTLE 2F7123

## (undated) DEVICE — SPONGE KITTNER 31001010

## (undated) DEVICE — NDL 30GA 0.5" 305106

## (undated) DEVICE — STOCKING SLEEVE COMPRESSION CALF MED

## (undated) DEVICE — SOL WATER IRRIG 500ML BOTTLE 2F7113

## (undated) DEVICE — GLOVE PROTEXIS W/NEU-THERA 7.0  2D73TE70

## (undated) DEVICE — DRSG GAUZE 4X4" TRAY 6939

## (undated) DEVICE — DECANTER VIAL 2006S

## (undated) DEVICE — TAPE DURAPORE 3" SILK 1538-3

## (undated) DEVICE — SUCTION MANIFOLD NEPTUNE 2 SYS 4 PORT 0702-020-000

## (undated) DEVICE — GOWN LG DISP 9515

## (undated) DEVICE — LINEN TOWEL PACK X6 WHITE 5487

## (undated) DEVICE — SOL ADH LIQUID BENZOIN SWAB 0.6ML C1544

## (undated) DEVICE — PREP CHLORAPREP 26ML TINTED ORANGE  260815

## (undated) DEVICE — LABEL MEDICATION SYSTEM  3304

## (undated) DEVICE — SU VICRYL 3-0 SH 27" UND J416H

## (undated) DEVICE — NDL ANGIOCATH 14GA 2" 4088

## (undated) DEVICE — PREP SKIN SCRUB TRAY 4461A

## (undated) DEVICE — PANTIES MESH LG/XLG 2PK 706M2

## (undated) DEVICE — DRAPE C-ARM 60X42" 1013

## (undated) RX ORDER — FENTANYL CITRATE 50 UG/ML
INJECTION, SOLUTION INTRAMUSCULAR; INTRAVENOUS
Status: DISPENSED
Start: 2020-10-21

## (undated) RX ORDER — PHENYLEPHRINE HCL IN 0.9% NACL 1 MG/10 ML
SYRINGE (ML) INTRAVENOUS
Status: DISPENSED
Start: 2020-03-04

## (undated) RX ORDER — PROPOFOL 10 MG/ML
INJECTION, EMULSION INTRAVENOUS
Status: DISPENSED
Start: 2020-03-04

## (undated) RX ORDER — GABAPENTIN 300 MG/1
CAPSULE ORAL
Status: DISPENSED
Start: 2017-09-15

## (undated) RX ORDER — KETOROLAC TROMETHAMINE 30 MG/ML
INJECTION, SOLUTION INTRAMUSCULAR; INTRAVENOUS
Status: DISPENSED
Start: 2020-03-04

## (undated) RX ORDER — ONDANSETRON 2 MG/ML
INJECTION INTRAMUSCULAR; INTRAVENOUS
Status: DISPENSED
Start: 2020-03-04

## (undated) RX ORDER — BUPIVACAINE HYDROCHLORIDE 2.5 MG/ML
INJECTION, SOLUTION INFILTRATION; PERINEURAL
Status: DISPENSED
Start: 2020-10-21

## (undated) RX ORDER — LIDOCAINE HYDROCHLORIDE 10 MG/ML
INJECTION, SOLUTION EPIDURAL; INFILTRATION; INTRACAUDAL; PERINEURAL
Status: DISPENSED
Start: 2020-10-21

## (undated) RX ORDER — BUPIVACAINE HYDROCHLORIDE 2.5 MG/ML
INJECTION, SOLUTION INFILTRATION; PERINEURAL
Status: DISPENSED
Start: 2020-03-04

## (undated) RX ORDER — OXYCODONE HYDROCHLORIDE 5 MG/1
TABLET ORAL
Status: DISPENSED
Start: 2017-09-15

## (undated) RX ORDER — ONDANSETRON 2 MG/ML
INJECTION INTRAMUSCULAR; INTRAVENOUS
Status: DISPENSED
Start: 2020-10-21

## (undated) RX ORDER — GLYCOPYRROLATE 0.2 MG/ML
INJECTION, SOLUTION INTRAMUSCULAR; INTRAVENOUS
Status: DISPENSED
Start: 2020-03-04

## (undated) RX ORDER — IBUPROFEN 600 MG/1
TABLET, FILM COATED ORAL
Status: DISPENSED
Start: 2020-10-21

## (undated) RX ORDER — BUPIVACAINE HYDROCHLORIDE AND EPINEPHRINE 2.5; 5 MG/ML; UG/ML
INJECTION, SOLUTION EPIDURAL; INFILTRATION; INTRACAUDAL; PERINEURAL
Status: DISPENSED
Start: 2020-10-21

## (undated) RX ORDER — CEFAZOLIN SODIUM 2 G/100ML
INJECTION, SOLUTION INTRAVENOUS
Status: DISPENSED
Start: 2020-03-04

## (undated) RX ORDER — SCOLOPAMINE TRANSDERMAL SYSTEM 1 MG/1
PATCH, EXTENDED RELEASE TRANSDERMAL
Status: DISPENSED
Start: 2020-10-21

## (undated) RX ORDER — FENTANYL CITRATE 50 UG/ML
INJECTION, SOLUTION INTRAMUSCULAR; INTRAVENOUS
Status: DISPENSED
Start: 2017-06-19

## (undated) RX ORDER — KETAMINE HYDROCHLORIDE 10 MG/ML
INJECTION, SOLUTION INTRAMUSCULAR; INTRAVENOUS
Status: DISPENSED
Start: 2017-09-15

## (undated) RX ORDER — PROPOFOL 10 MG/ML
INJECTION, EMULSION INTRAVENOUS
Status: DISPENSED
Start: 2020-10-21

## (undated) RX ORDER — NEOSTIGMINE METHYLSULFATE 1 MG/ML
VIAL (ML) INJECTION
Status: DISPENSED
Start: 2020-03-04

## (undated) RX ORDER — ACETAMINOPHEN 325 MG/1
TABLET ORAL
Status: DISPENSED
Start: 2020-10-21

## (undated) RX ORDER — CEFAZOLIN SODIUM 1 G/3ML
INJECTION, POWDER, FOR SOLUTION INTRAMUSCULAR; INTRAVENOUS
Status: DISPENSED
Start: 2020-10-21

## (undated) RX ORDER — ACETAMINOPHEN 325 MG/1
TABLET ORAL
Status: DISPENSED
Start: 2017-09-15

## (undated) RX ORDER — EPHEDRINE SULFATE 50 MG/ML
INJECTION, SOLUTION INTRAMUSCULAR; INTRAVENOUS; SUBCUTANEOUS
Status: DISPENSED
Start: 2020-03-04

## (undated) RX ORDER — DEXAMETHASONE SODIUM PHOSPHATE 10 MG/ML
INJECTION, SOLUTION INTRAMUSCULAR; INTRAVENOUS
Status: DISPENSED
Start: 2020-10-21

## (undated) RX ORDER — HYDROXYZINE HYDROCHLORIDE 50 MG/1
TABLET, FILM COATED ORAL
Status: DISPENSED
Start: 2020-10-21

## (undated) RX ORDER — GABAPENTIN 300 MG/1
CAPSULE ORAL
Status: DISPENSED
Start: 2020-10-21

## (undated) RX ORDER — CEFAZOLIN SODIUM 2 G/100ML
INJECTION, SOLUTION INTRAVENOUS
Status: DISPENSED
Start: 2020-10-21

## (undated) RX ORDER — HYDROCODONE BITARTRATE AND ACETAMINOPHEN 5; 325 MG/1; MG/1
TABLET ORAL
Status: DISPENSED
Start: 2020-10-21

## (undated) RX ORDER — GLYCOPYRROLATE 0.2 MG/ML
INJECTION, SOLUTION INTRAMUSCULAR; INTRAVENOUS
Status: DISPENSED
Start: 2020-10-21

## (undated) RX ORDER — CEFAZOLIN SODIUM 1 G/3ML
INJECTION, POWDER, FOR SOLUTION INTRAMUSCULAR; INTRAVENOUS
Status: DISPENSED
Start: 2020-03-04

## (undated) RX ORDER — FENTANYL CITRATE 50 UG/ML
INJECTION, SOLUTION INTRAMUSCULAR; INTRAVENOUS
Status: DISPENSED
Start: 2020-03-04

## (undated) RX ORDER — LIDOCAINE HYDROCHLORIDE 10 MG/ML
INJECTION, SOLUTION EPIDURAL; INFILTRATION; INTRACAUDAL; PERINEURAL
Status: DISPENSED
Start: 2020-03-04

## (undated) RX ORDER — FENTANYL CITRATE 50 UG/ML
INJECTION, SOLUTION INTRAMUSCULAR; INTRAVENOUS
Status: DISPENSED
Start: 2017-09-15

## (undated) RX ORDER — ACETAMINOPHEN 325 MG/1
TABLET ORAL
Status: DISPENSED
Start: 2020-03-04

## (undated) RX ORDER — HYDROXYZINE HYDROCHLORIDE 25 MG/1
TABLET, FILM COATED ORAL
Status: DISPENSED
Start: 2020-03-04

## (undated) RX ORDER — HYDROCODONE BITARTRATE AND ACETAMINOPHEN 5; 325 MG/1; MG/1
TABLET ORAL
Status: DISPENSED
Start: 2020-03-04

## (undated) RX ORDER — DEXAMETHASONE SODIUM PHOSPHATE 4 MG/ML
INJECTION, SOLUTION INTRA-ARTICULAR; INTRALESIONAL; INTRAMUSCULAR; INTRAVENOUS; SOFT TISSUE
Status: DISPENSED
Start: 2020-03-04

## (undated) RX ORDER — ONDANSETRON 2 MG/ML
INJECTION INTRAMUSCULAR; INTRAVENOUS
Status: DISPENSED
Start: 2017-09-15